# Patient Record
Sex: MALE | Race: OTHER | NOT HISPANIC OR LATINO
[De-identification: names, ages, dates, MRNs, and addresses within clinical notes are randomized per-mention and may not be internally consistent; named-entity substitution may affect disease eponyms.]

---

## 2017-02-08 ENCOUNTER — APPOINTMENT (OUTPATIENT)
Dept: OPHTHALMOLOGY | Facility: CLINIC | Age: 72
End: 2017-02-08

## 2017-02-08 RX ORDER — BRINZOLAMIDE 10 MG/ML
1 SUSPENSION/ DROPS OPHTHALMIC
Qty: 1 | Refills: 5 | Status: ACTIVE | COMMUNITY
Start: 2017-02-08 | End: 1900-01-01

## 2017-05-17 ENCOUNTER — APPOINTMENT (OUTPATIENT)
Dept: OPHTHALMOLOGY | Facility: CLINIC | Age: 72
End: 2017-05-17

## 2017-05-31 ENCOUNTER — APPOINTMENT (OUTPATIENT)
Dept: OPHTHALMOLOGY | Facility: CLINIC | Age: 72
End: 2017-05-31

## 2017-07-12 ENCOUNTER — APPOINTMENT (OUTPATIENT)
Dept: OPHTHALMOLOGY | Facility: CLINIC | Age: 72
End: 2017-07-12

## 2017-07-13 RX ORDER — DORZOLAMIDE HYDROCHLORIDE 20 MG/ML
2 SOLUTION OPHTHALMIC
Qty: 1 | Refills: 6 | Status: ACTIVE | COMMUNITY
Start: 2017-02-08 | End: 1900-01-01

## 2017-08-01 ENCOUNTER — APPOINTMENT (OUTPATIENT)
Dept: OPHTHALMOLOGY | Facility: AMBULATORY SURGERY CENTER | Age: 72
End: 2017-08-01
Payer: MEDICARE

## 2017-08-01 ENCOUNTER — OUTPATIENT (OUTPATIENT)
Dept: OUTPATIENT SERVICES | Facility: HOSPITAL | Age: 72
LOS: 1 days | Discharge: HOME | End: 2017-08-01

## 2017-08-01 PROCEDURE — 0449T INSJ AQUEOUS DRAIN DEV 1ST: CPT

## 2017-08-01 PROCEDURE — 66984 XCAPSL CTRC RMVL W/O ECP: CPT | Mod: RT

## 2017-08-02 ENCOUNTER — APPOINTMENT (OUTPATIENT)
Dept: OPHTHALMOLOGY | Facility: CLINIC | Age: 72
End: 2017-08-02
Payer: MEDICARE

## 2017-08-02 PROCEDURE — 99024 POSTOP FOLLOW-UP VISIT: CPT

## 2017-08-03 DIAGNOSIS — H26.9 UNSPECIFIED CATARACT: ICD-10-CM

## 2017-08-03 DIAGNOSIS — K44.9 DIAPHRAGMATIC HERNIA WITHOUT OBSTRUCTION OR GANGRENE: ICD-10-CM

## 2017-08-03 DIAGNOSIS — N40.0 BENIGN PROSTATIC HYPERPLASIA WITHOUT LOWER URINARY TRACT SYMPTOMS: ICD-10-CM

## 2017-08-03 DIAGNOSIS — M54.5 LOW BACK PAIN: ICD-10-CM

## 2017-08-03 DIAGNOSIS — I10 ESSENTIAL (PRIMARY) HYPERTENSION: ICD-10-CM

## 2017-08-03 DIAGNOSIS — H40.9 UNSPECIFIED GLAUCOMA: ICD-10-CM

## 2017-08-03 DIAGNOSIS — J45.909 UNSPECIFIED ASTHMA, UNCOMPLICATED: ICD-10-CM

## 2017-08-03 DIAGNOSIS — Z79.82 LONG TERM (CURRENT) USE OF ASPIRIN: ICD-10-CM

## 2017-08-09 ENCOUNTER — APPOINTMENT (OUTPATIENT)
Dept: OPHTHALMOLOGY | Facility: CLINIC | Age: 72
End: 2017-08-09
Payer: MEDICARE

## 2017-08-09 PROCEDURE — 99024 POSTOP FOLLOW-UP VISIT: CPT

## 2017-08-25 RX ORDER — PREDNISOLONE ACETATE 10 MG/ML
1 SUSPENSION/ DROPS OPHTHALMIC
Qty: 1 | Refills: 3 | Status: ACTIVE | COMMUNITY
Start: 2017-07-13 | End: 1900-01-01

## 2017-08-25 RX ORDER — OFLOXACIN 3 MG/ML
0.3 SOLUTION/ DROPS OPHTHALMIC
Qty: 1 | Refills: 2 | Status: ACTIVE | COMMUNITY
Start: 2017-07-13 | End: 1900-01-01

## 2017-08-30 ENCOUNTER — APPOINTMENT (OUTPATIENT)
Dept: OPHTHALMOLOGY | Facility: CLINIC | Age: 72
End: 2017-08-30
Payer: MEDICARE

## 2017-08-30 DIAGNOSIS — H34.211 PARTIAL RETINAL ARTERY OCCLUSION, RIGHT EYE: ICD-10-CM

## 2017-08-30 DIAGNOSIS — H25.813 COMBINED FORMS OF AGE-RELATED CATARACT, BILATERAL: ICD-10-CM

## 2017-08-30 DIAGNOSIS — Z96.1 PRESENCE OF INTRAOCULAR LENS: ICD-10-CM

## 2017-08-30 PROCEDURE — 99024 POSTOP FOLLOW-UP VISIT: CPT

## 2017-08-30 PROCEDURE — 92134 CPTRZ OPH DX IMG PST SGM RTA: CPT

## 2017-08-30 PROCEDURE — 92012 INTRM OPH EXAM EST PATIENT: CPT

## 2017-08-30 PROCEDURE — 0474T INSJ AQUEOUS DRG DEV IO RSVR: CPT

## 2017-08-30 PROCEDURE — 92226: CPT | Mod: LT

## 2017-09-05 ENCOUNTER — APPOINTMENT (OUTPATIENT)
Dept: OPHTHALMOLOGY | Facility: AMBULATORY SURGERY CENTER | Age: 72
End: 2017-09-05
Payer: MEDICARE

## 2017-09-05 ENCOUNTER — OUTPATIENT (OUTPATIENT)
Dept: OUTPATIENT SERVICES | Facility: HOSPITAL | Age: 72
LOS: 1 days | Discharge: HOME | End: 2017-09-05

## 2017-09-05 PROCEDURE — 66984 XCAPSL CTRC RMVL W/O ECP: CPT | Mod: 79,LT

## 2017-09-06 ENCOUNTER — APPOINTMENT (OUTPATIENT)
Dept: OPHTHALMOLOGY | Facility: CLINIC | Age: 72
End: 2017-09-06
Payer: MEDICARE

## 2017-09-06 PROCEDURE — 99024 POSTOP FOLLOW-UP VISIT: CPT

## 2017-09-07 DIAGNOSIS — I10 ESSENTIAL (PRIMARY) HYPERTENSION: ICD-10-CM

## 2017-09-07 DIAGNOSIS — N40.0 BENIGN PROSTATIC HYPERPLASIA WITHOUT LOWER URINARY TRACT SYMPTOMS: ICD-10-CM

## 2017-09-07 DIAGNOSIS — K21.9 GASTRO-ESOPHAGEAL REFLUX DISEASE WITHOUT ESOPHAGITIS: ICD-10-CM

## 2017-09-07 DIAGNOSIS — Z79.82 LONG TERM (CURRENT) USE OF ASPIRIN: ICD-10-CM

## 2017-09-07 DIAGNOSIS — H40.9 UNSPECIFIED GLAUCOMA: ICD-10-CM

## 2017-09-07 DIAGNOSIS — H25.12 AGE-RELATED NUCLEAR CATARACT, LEFT EYE: ICD-10-CM

## 2017-09-07 DIAGNOSIS — J45.909 UNSPECIFIED ASTHMA, UNCOMPLICATED: ICD-10-CM

## 2017-09-13 ENCOUNTER — APPOINTMENT (OUTPATIENT)
Dept: OPHTHALMOLOGY | Facility: CLINIC | Age: 72
End: 2017-09-13
Payer: MEDICARE

## 2017-09-13 PROCEDURE — 99024 POSTOP FOLLOW-UP VISIT: CPT

## 2017-12-07 ENCOUNTER — APPOINTMENT (OUTPATIENT)
Dept: OPHTHALMOLOGY | Facility: CLINIC | Age: 72
End: 2017-12-07
Payer: MEDICARE

## 2017-12-07 PROCEDURE — 65855 TRABECULOPLASTY LASER SURG: CPT | Mod: RT

## 2018-02-15 ENCOUNTER — APPOINTMENT (OUTPATIENT)
Dept: OPHTHALMOLOGY | Facility: CLINIC | Age: 73
End: 2018-02-15
Payer: MEDICARE

## 2018-02-15 PROCEDURE — 92012 INTRM OPH EXAM EST PATIENT: CPT

## 2018-02-28 ENCOUNTER — APPOINTMENT (OUTPATIENT)
Dept: OPHTHALMOLOGY | Facility: CLINIC | Age: 73
End: 2018-02-28
Payer: MEDICARE

## 2018-02-28 DIAGNOSIS — Z96.1 PRESENCE OF INTRAOCULAR LENS: ICD-10-CM

## 2018-02-28 PROCEDURE — 92226: CPT | Mod: LT

## 2018-02-28 PROCEDURE — 92014 COMPRE OPH EXAM EST PT 1/>: CPT

## 2018-02-28 PROCEDURE — 92134 CPTRZ OPH DX IMG PST SGM RTA: CPT

## 2018-05-17 ENCOUNTER — APPOINTMENT (OUTPATIENT)
Dept: OPHTHALMOLOGY | Facility: CLINIC | Age: 73
End: 2018-05-17
Payer: MEDICARE

## 2018-05-17 PROCEDURE — 92083 EXTENDED VISUAL FIELD XM: CPT

## 2018-05-17 PROCEDURE — 92014 COMPRE OPH EXAM EST PT 1/>: CPT

## 2018-05-17 PROCEDURE — 92020 GONIOSCOPY: CPT

## 2018-05-17 PROCEDURE — 92250 FUNDUS PHOTOGRAPHY W/I&R: CPT

## 2018-07-16 ENCOUNTER — APPOINTMENT (OUTPATIENT)
Dept: OPHTHALMOLOGY | Facility: CLINIC | Age: 73
End: 2018-07-16

## 2018-07-16 ENCOUNTER — OUTPATIENT (OUTPATIENT)
Dept: OUTPATIENT SERVICES | Facility: HOSPITAL | Age: 73
LOS: 1 days | End: 2018-07-16

## 2018-07-16 ENCOUNTER — APPOINTMENT (OUTPATIENT)
Dept: OPHTHALMOLOGY | Facility: CLINIC | Age: 73
End: 2018-07-16
Payer: MEDICARE

## 2018-07-16 PROCEDURE — 65855 TRABECULOPLASTY LASER SURG: CPT | Mod: LT

## 2018-07-17 DIAGNOSIS — H40.1420 CAPSULAR GLAUCOMA WITH PSEUDOEXFOLIATION OF LENS, LEFT EYE, STAGE UNSPECIFIED: ICD-10-CM

## 2018-07-30 ENCOUNTER — APPOINTMENT (OUTPATIENT)
Dept: OPHTHALMOLOGY | Facility: CLINIC | Age: 73
End: 2018-07-30
Payer: MEDICARE

## 2018-07-30 ENCOUNTER — APPOINTMENT (OUTPATIENT)
Dept: OPHTHALMOLOGY | Facility: CLINIC | Age: 73
End: 2018-07-30

## 2018-07-30 ENCOUNTER — OUTPATIENT (OUTPATIENT)
Dept: OUTPATIENT SERVICES | Facility: HOSPITAL | Age: 73
LOS: 1 days | End: 2018-07-30

## 2018-07-30 PROCEDURE — 65855 TRABECULOPLASTY LASER SURG: CPT | Mod: RT

## 2018-07-31 DIAGNOSIS — H40.1113 PRIMARY OPEN-ANGLE GLAUCOMA, RIGHT EYE, SEVERE STAGE: ICD-10-CM

## 2018-10-24 ENCOUNTER — APPOINTMENT (OUTPATIENT)
Dept: OPHTHALMOLOGY | Facility: CLINIC | Age: 73
End: 2018-10-24
Payer: MEDICARE

## 2018-10-24 PROCEDURE — 92012 INTRM OPH EXAM EST PATIENT: CPT

## 2018-10-24 RX ORDER — NETARSUDIL 0.2 MG/ML
0.02 SOLUTION/ DROPS OPHTHALMIC; TOPICAL
Qty: 1 | Refills: 6 | Status: ACTIVE | COMMUNITY
Start: 2018-10-24 | End: 1900-01-01

## 2018-12-12 ENCOUNTER — APPOINTMENT (OUTPATIENT)
Dept: OPHTHALMOLOGY | Facility: CLINIC | Age: 73
End: 2018-12-12
Payer: MEDICARE

## 2018-12-12 PROCEDURE — 92012 INTRM OPH EXAM EST PATIENT: CPT

## 2018-12-12 PROCEDURE — 92083 EXTENDED VISUAL FIELD XM: CPT

## 2018-12-12 PROCEDURE — 92134 CPTRZ OPH DX IMG PST SGM RTA: CPT

## 2018-12-12 RX ORDER — KETOROLAC TROMETHAMINE 5 MG/ML
0.5 SOLUTION OPHTHALMIC
Qty: 1 | Refills: 3 | Status: ACTIVE | COMMUNITY
Start: 2017-07-13 | End: 1900-01-01

## 2018-12-12 RX ORDER — BETAXOLOL HYDROCHLORIDE 5.6 MG/ML
0.5 SOLUTION/ DROPS OPHTHALMIC
Qty: 1 | Refills: 3 | Status: ACTIVE | COMMUNITY
Start: 2018-12-12 | End: 1900-01-01

## 2019-01-17 ENCOUNTER — APPOINTMENT (OUTPATIENT)
Dept: OPHTHALMOLOGY | Facility: CLINIC | Age: 74
End: 2019-01-17
Payer: MEDICARE

## 2019-01-17 DIAGNOSIS — H43.811 VITREOUS DEGENERATION, RIGHT EYE: ICD-10-CM

## 2019-01-17 DIAGNOSIS — H25.12 AGE-RELATED NUCLEAR CATARACT, LEFT EYE: ICD-10-CM

## 2019-01-17 DIAGNOSIS — H35.353 CYSTOID MACULAR DEGENERATION, BILATERAL: ICD-10-CM

## 2019-01-17 DIAGNOSIS — D31.32 BENIGN NEOPLASM OF LEFT CHOROID: ICD-10-CM

## 2019-01-17 DIAGNOSIS — H35.373 PUCKERING OF MACULA, BILATERAL: ICD-10-CM

## 2019-01-17 DIAGNOSIS — Z96.1 PRESENCE OF INTRAOCULAR LENS: ICD-10-CM

## 2019-01-17 PROCEDURE — 92134 CPTRZ OPH DX IMG PST SGM RTA: CPT

## 2019-01-17 PROCEDURE — 92014 COMPRE OPH EXAM EST PT 1/>: CPT

## 2019-01-17 PROCEDURE — 92226: CPT | Mod: LT

## 2019-02-07 ENCOUNTER — APPOINTMENT (OUTPATIENT)
Dept: OPHTHALMOLOGY | Facility: CLINIC | Age: 74
End: 2019-02-07
Payer: MEDICARE

## 2019-02-07 DIAGNOSIS — H40.1113 PRIMARY OPEN-ANGLE GLAUCOMA, RIGHT EYE, SEVERE STAGE: ICD-10-CM

## 2019-02-07 PROCEDURE — 92083 EXTENDED VISUAL FIELD XM: CPT

## 2019-02-07 PROCEDURE — 92012 INTRM OPH EXAM EST PATIENT: CPT

## 2019-06-06 ENCOUNTER — NON-APPOINTMENT (OUTPATIENT)
Age: 74
End: 2019-06-06

## 2019-06-06 ENCOUNTER — APPOINTMENT (OUTPATIENT)
Dept: OPHTHALMOLOGY | Facility: CLINIC | Age: 74
End: 2019-06-06
Payer: MEDICARE

## 2019-06-06 PROCEDURE — 92012 INTRM OPH EXAM EST PATIENT: CPT

## 2019-11-14 ENCOUNTER — APPOINTMENT (OUTPATIENT)
Dept: OPHTHALMOLOGY | Facility: CLINIC | Age: 74
End: 2019-11-14
Payer: MEDICARE

## 2019-11-14 ENCOUNTER — NON-APPOINTMENT (OUTPATIENT)
Age: 74
End: 2019-11-14

## 2019-11-14 PROCEDURE — 92083 EXTENDED VISUAL FIELD XM: CPT

## 2019-11-14 PROCEDURE — 92012 INTRM OPH EXAM EST PATIENT: CPT

## 2020-01-22 ENCOUNTER — APPOINTMENT (OUTPATIENT)
Dept: OPHTHALMOLOGY | Facility: CLINIC | Age: 75
End: 2020-01-22
Payer: MEDICARE

## 2020-01-22 ENCOUNTER — NON-APPOINTMENT (OUTPATIENT)
Age: 75
End: 2020-01-22

## 2020-01-22 PROCEDURE — 92134 CPTRZ OPH DX IMG PST SGM RTA: CPT

## 2020-01-22 PROCEDURE — 92201 OPSCPY EXTND RTA DRAW UNI/BI: CPT

## 2020-01-22 PROCEDURE — 92014 COMPRE OPH EXAM EST PT 1/>: CPT

## 2020-05-14 ENCOUNTER — NON-APPOINTMENT (OUTPATIENT)
Age: 75
End: 2020-05-14

## 2020-06-15 ENCOUNTER — NON-APPOINTMENT (OUTPATIENT)
Age: 75
End: 2020-06-15

## 2020-06-15 ENCOUNTER — APPOINTMENT (OUTPATIENT)
Dept: OPHTHALMOLOGY | Facility: CLINIC | Age: 75
End: 2020-06-15
Payer: MEDICARE

## 2020-06-15 PROCEDURE — 92020 GONIOSCOPY: CPT

## 2020-06-15 PROCEDURE — 92014 COMPRE OPH EXAM EST PT 1/>: CPT

## 2020-06-15 PROCEDURE — 92250 FUNDUS PHOTOGRAPHY W/I&R: CPT

## 2020-09-16 ENCOUNTER — APPOINTMENT (OUTPATIENT)
Dept: OPHTHALMOLOGY | Facility: CLINIC | Age: 75
End: 2020-09-16
Payer: MEDICARE

## 2020-09-16 ENCOUNTER — NON-APPOINTMENT (OUTPATIENT)
Age: 75
End: 2020-09-16

## 2020-09-16 PROCEDURE — 92133 CPTRZD OPH DX IMG PST SGM ON: CPT

## 2020-09-16 PROCEDURE — 92083 EXTENDED VISUAL FIELD XM: CPT

## 2020-09-16 PROCEDURE — 99213 OFFICE O/P EST LOW 20 MIN: CPT

## 2020-11-16 ENCOUNTER — NON-APPOINTMENT (OUTPATIENT)
Age: 75
End: 2020-11-16

## 2020-11-16 ENCOUNTER — APPOINTMENT (OUTPATIENT)
Dept: OPHTHALMOLOGY | Facility: CLINIC | Age: 75
End: 2020-11-16

## 2020-11-16 ENCOUNTER — OUTPATIENT (OUTPATIENT)
Dept: OUTPATIENT SERVICES | Facility: HOSPITAL | Age: 75
LOS: 1 days | End: 2020-11-16
Payer: MEDICARE

## 2020-11-16 PROCEDURE — 65855 TRABECULOPLASTY LASER SURG: CPT | Mod: LT

## 2020-11-17 DIAGNOSIS — H40.1133 PRIMARY OPEN-ANGLE GLAUCOMA, BILATERAL, SEVERE STAGE: ICD-10-CM

## 2021-01-04 ENCOUNTER — APPOINTMENT (OUTPATIENT)
Dept: OPHTHALMOLOGY | Facility: CLINIC | Age: 76
End: 2021-01-04

## 2021-01-04 ENCOUNTER — NON-APPOINTMENT (OUTPATIENT)
Age: 76
End: 2021-01-04

## 2021-01-04 ENCOUNTER — OUTPATIENT (OUTPATIENT)
Dept: OUTPATIENT SERVICES | Facility: HOSPITAL | Age: 76
LOS: 1 days | End: 2021-01-04
Payer: MEDICARE

## 2021-01-04 PROCEDURE — 65855 TRABECULOPLASTY LASER SURG: CPT | Mod: RT

## 2021-01-05 DIAGNOSIS — H40.1133 PRIMARY OPEN-ANGLE GLAUCOMA, BILATERAL, SEVERE STAGE: ICD-10-CM

## 2021-01-27 ENCOUNTER — APPOINTMENT (OUTPATIENT)
Dept: OPHTHALMOLOGY | Facility: CLINIC | Age: 76
End: 2021-01-27

## 2021-02-10 ENCOUNTER — NON-APPOINTMENT (OUTPATIENT)
Age: 76
End: 2021-02-10

## 2021-02-10 ENCOUNTER — APPOINTMENT (OUTPATIENT)
Dept: OPHTHALMOLOGY | Facility: CLINIC | Age: 76
End: 2021-02-10
Payer: MEDICARE

## 2021-02-10 PROCEDURE — 92012 INTRM OPH EXAM EST PATIENT: CPT

## 2021-02-10 PROCEDURE — 92083 EXTENDED VISUAL FIELD XM: CPT

## 2021-06-07 ENCOUNTER — APPOINTMENT (OUTPATIENT)
Dept: OPHTHALMOLOGY | Facility: CLINIC | Age: 76
End: 2021-06-07
Payer: MEDICARE

## 2021-06-07 ENCOUNTER — NON-APPOINTMENT (OUTPATIENT)
Age: 76
End: 2021-06-07

## 2021-06-07 PROCEDURE — 92201 OPSCPY EXTND RTA DRAW UNI/BI: CPT

## 2021-06-07 PROCEDURE — 92134 CPTRZ OPH DX IMG PST SGM RTA: CPT

## 2021-06-07 PROCEDURE — 92014 COMPRE OPH EXAM EST PT 1/>: CPT

## 2021-07-14 ENCOUNTER — APPOINTMENT (OUTPATIENT)
Dept: OPHTHALMOLOGY | Facility: CLINIC | Age: 76
End: 2021-07-14
Payer: MEDICARE

## 2021-07-14 ENCOUNTER — NON-APPOINTMENT (OUTPATIENT)
Age: 76
End: 2021-07-14

## 2021-07-14 PROCEDURE — 92020 GONIOSCOPY: CPT

## 2021-07-14 PROCEDURE — 92083 EXTENDED VISUAL FIELD XM: CPT

## 2021-07-14 PROCEDURE — 92014 COMPRE OPH EXAM EST PT 1/>: CPT

## 2021-07-14 PROCEDURE — 92250 FUNDUS PHOTOGRAPHY W/I&R: CPT

## 2021-11-17 ENCOUNTER — NON-APPOINTMENT (OUTPATIENT)
Age: 76
End: 2021-11-17

## 2021-11-17 ENCOUNTER — APPOINTMENT (OUTPATIENT)
Dept: OPHTHALMOLOGY | Facility: CLINIC | Age: 76
End: 2021-11-17
Payer: MEDICARE

## 2021-11-17 PROCEDURE — 92133 CPTRZD OPH DX IMG PST SGM ON: CPT

## 2021-11-17 PROCEDURE — 92014 COMPRE OPH EXAM EST PT 1/>: CPT

## 2021-11-17 PROCEDURE — 92083 EXTENDED VISUAL FIELD XM: CPT

## 2022-03-23 ENCOUNTER — APPOINTMENT (OUTPATIENT)
Dept: OPHTHALMOLOGY | Facility: CLINIC | Age: 77
End: 2022-03-23
Payer: MEDICARE

## 2022-03-23 ENCOUNTER — NON-APPOINTMENT (OUTPATIENT)
Age: 77
End: 2022-03-23

## 2022-03-23 PROCEDURE — 92134 CPTRZ OPH DX IMG PST SGM RTA: CPT

## 2022-03-23 PROCEDURE — 92083 EXTENDED VISUAL FIELD XM: CPT

## 2022-03-23 PROCEDURE — 99213 OFFICE O/P EST LOW 20 MIN: CPT

## 2022-06-13 ENCOUNTER — APPOINTMENT (OUTPATIENT)
Dept: OPHTHALMOLOGY | Facility: CLINIC | Age: 77
End: 2022-06-13
Payer: MEDICARE

## 2022-06-13 ENCOUNTER — NON-APPOINTMENT (OUTPATIENT)
Age: 77
End: 2022-06-13

## 2022-06-13 PROCEDURE — 92134 CPTRZ OPH DX IMG PST SGM RTA: CPT

## 2022-06-13 PROCEDURE — 92014 COMPRE OPH EXAM EST PT 1/>: CPT

## 2022-07-27 ENCOUNTER — NON-APPOINTMENT (OUTPATIENT)
Age: 77
End: 2022-07-27

## 2022-07-27 ENCOUNTER — APPOINTMENT (OUTPATIENT)
Dept: OPHTHALMOLOGY | Facility: CLINIC | Age: 77
End: 2022-07-27

## 2022-07-27 PROCEDURE — 92020 GONIOSCOPY: CPT

## 2022-07-27 PROCEDURE — 92250 FUNDUS PHOTOGRAPHY W/I&R: CPT

## 2022-07-27 PROCEDURE — 92083 EXTENDED VISUAL FIELD XM: CPT

## 2022-07-27 PROCEDURE — 92014 COMPRE OPH EXAM EST PT 1/>: CPT

## 2022-09-12 ENCOUNTER — NON-APPOINTMENT (OUTPATIENT)
Age: 77
End: 2022-09-12

## 2022-09-12 ENCOUNTER — APPOINTMENT (OUTPATIENT)
Dept: OPHTHALMOLOGY | Facility: CLINIC | Age: 77
End: 2022-09-12

## 2022-09-12 PROCEDURE — 66821 AFTER CATARACT LASER SURGERY: CPT | Mod: RT

## 2022-10-10 ENCOUNTER — APPOINTMENT (OUTPATIENT)
Dept: OPHTHALMOLOGY | Facility: CLINIC | Age: 77
End: 2022-10-10

## 2022-10-10 ENCOUNTER — NON-APPOINTMENT (OUTPATIENT)
Age: 77
End: 2022-10-10

## 2022-10-10 PROCEDURE — 66821 AFTER CATARACT LASER SURGERY: CPT | Mod: 78,LT

## 2022-12-08 ENCOUNTER — APPOINTMENT (OUTPATIENT)
Dept: OPHTHALMOLOGY | Facility: CLINIC | Age: 77
End: 2022-12-08

## 2022-12-08 ENCOUNTER — NON-APPOINTMENT (OUTPATIENT)
Age: 77
End: 2022-12-08

## 2022-12-08 PROCEDURE — 92012 INTRM OPH EXAM EST PATIENT: CPT | Mod: 24

## 2022-12-08 PROCEDURE — 92133 CPTRZD OPH DX IMG PST SGM ON: CPT

## 2022-12-08 PROCEDURE — 92083 EXTENDED VISUAL FIELD XM: CPT

## 2023-03-29 ENCOUNTER — APPOINTMENT (OUTPATIENT)
Dept: OPHTHALMOLOGY | Facility: CLINIC | Age: 78
End: 2023-03-29
Payer: MEDICARE

## 2023-03-29 ENCOUNTER — NON-APPOINTMENT (OUTPATIENT)
Age: 78
End: 2023-03-29

## 2023-03-29 PROCEDURE — 92012 INTRM OPH EXAM EST PATIENT: CPT

## 2023-03-29 PROCEDURE — 92083 EXTENDED VISUAL FIELD XM: CPT

## 2023-06-14 ENCOUNTER — APPOINTMENT (OUTPATIENT)
Dept: OPHTHALMOLOGY | Facility: CLINIC | Age: 78
End: 2023-06-14
Payer: MEDICARE

## 2023-06-14 ENCOUNTER — NON-APPOINTMENT (OUTPATIENT)
Age: 78
End: 2023-06-14

## 2023-06-14 PROCEDURE — 92134 CPTRZ OPH DX IMG PST SGM RTA: CPT

## 2023-06-14 PROCEDURE — 92014 COMPRE OPH EXAM EST PT 1/>: CPT

## 2023-06-14 PROCEDURE — 92201 OPSCPY EXTND RTA DRAW UNI/BI: CPT

## 2023-07-26 ENCOUNTER — NON-APPOINTMENT (OUTPATIENT)
Age: 78
End: 2023-07-26

## 2023-07-26 ENCOUNTER — APPOINTMENT (OUTPATIENT)
Dept: OPHTHALMOLOGY | Facility: CLINIC | Age: 78
End: 2023-07-26
Payer: MEDICARE

## 2023-07-26 PROCEDURE — 92083 EXTENDED VISUAL FIELD XM: CPT

## 2023-07-26 PROCEDURE — 92020 GONIOSCOPY: CPT

## 2023-07-26 PROCEDURE — 92250 FUNDUS PHOTOGRAPHY W/I&R: CPT

## 2023-07-26 PROCEDURE — 92014 COMPRE OPH EXAM EST PT 1/>: CPT

## 2023-12-13 ENCOUNTER — NON-APPOINTMENT (OUTPATIENT)
Age: 78
End: 2023-12-13

## 2023-12-13 ENCOUNTER — APPOINTMENT (OUTPATIENT)
Dept: OPHTHALMOLOGY | Facility: CLINIC | Age: 78
End: 2023-12-13
Payer: MEDICARE

## 2023-12-13 PROCEDURE — 92012 INTRM OPH EXAM EST PATIENT: CPT

## 2023-12-13 PROCEDURE — 92133 CPTRZD OPH DX IMG PST SGM ON: CPT

## 2024-04-17 ENCOUNTER — NON-APPOINTMENT (OUTPATIENT)
Age: 79
End: 2024-04-17

## 2024-04-17 ENCOUNTER — APPOINTMENT (OUTPATIENT)
Dept: OPHTHALMOLOGY | Facility: CLINIC | Age: 79
End: 2024-04-17
Payer: MEDICARE

## 2024-04-17 PROCEDURE — 92083 EXTENDED VISUAL FIELD XM: CPT

## 2024-04-17 PROCEDURE — 92012 INTRM OPH EXAM EST PATIENT: CPT

## 2024-06-20 ENCOUNTER — APPOINTMENT (OUTPATIENT)
Dept: OPHTHALMOLOGY | Facility: CLINIC | Age: 79
End: 2024-06-20
Payer: MEDICARE

## 2024-06-20 ENCOUNTER — NON-APPOINTMENT (OUTPATIENT)
Age: 79
End: 2024-06-20

## 2024-06-20 PROCEDURE — 92134 CPTRZ OPH DX IMG PST SGM RTA: CPT

## 2024-06-20 PROCEDURE — 92201 OPSCPY EXTND RTA DRAW UNI/BI: CPT

## 2024-06-20 PROCEDURE — 92014 COMPRE OPH EXAM EST PT 1/>: CPT

## 2024-09-04 ENCOUNTER — APPOINTMENT (OUTPATIENT)
Dept: OPHTHALMOLOGY | Facility: CLINIC | Age: 79
End: 2024-09-04
Payer: MEDICARE

## 2024-09-04 ENCOUNTER — NON-APPOINTMENT (OUTPATIENT)
Age: 79
End: 2024-09-04

## 2024-09-04 PROCEDURE — 92014 COMPRE OPH EXAM EST PT 1/>: CPT

## 2024-09-04 PROCEDURE — 92250 FUNDUS PHOTOGRAPHY W/I&R: CPT

## 2024-09-04 PROCEDURE — 92020 GONIOSCOPY: CPT

## 2024-09-04 PROCEDURE — 92083 EXTENDED VISUAL FIELD XM: CPT

## 2025-01-22 ENCOUNTER — EMERGENCY (EMERGENCY)
Age: 80
LOS: 1 days | Discharge: HOME | End: 2025-01-22
Attending: EMERGENCY MEDICINE | Admitting: EMERGENCY MEDICINE
Payer: MEDICARE

## 2025-01-22 VITALS
OXYGEN SATURATION: 98 % | SYSTOLIC BLOOD PRESSURE: 183 MMHG | HEART RATE: 94 BPM | RESPIRATION RATE: 18 BRPM | WEIGHT: 164.91 LBS | TEMPERATURE: 98 F | DIASTOLIC BLOOD PRESSURE: 93 MMHG
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
## 2025-01-22 DIAGNOSIS — S01.01XA LACERATION WITHOUT FOREIGN BODY OF SCALP, INITIAL ENCOUNTER: ICD-10-CM

## 2025-01-22 DIAGNOSIS — Z23 ENCOUNTER FOR IMMUNIZATION: ICD-10-CM
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
## 2025-01-22 DIAGNOSIS — Z79.01 LONG TERM (CURRENT) USE OF ANTICOAGULANTS: ICD-10-CM

## 2025-01-22 LAB
ALBUMIN SERPL ELPH-MCNC: 4.2 G/DL — SIGNIFICANT CHANGE UP (ref 3.4–5)
ALP SERPL-CCNC: 98 U/L — SIGNIFICANT CHANGE UP (ref 40–120)
ALT FLD-CCNC: 20 U/L — SIGNIFICANT CHANGE UP (ref 12–42)
ANION GAP SERPL CALC-SCNC: 11 MMOL/L — SIGNIFICANT CHANGE UP (ref 9–16)
APTT BLD: 36.6 SEC — HIGH (ref 24.5–35.6)
AST SERPL-CCNC: 17 U/L — SIGNIFICANT CHANGE UP (ref 15–37)
BASOPHILS # BLD AUTO: 0.04 K/UL — SIGNIFICANT CHANGE UP (ref 0–0.2)
BASOPHILS NFR BLD AUTO: 0.4 % — SIGNIFICANT CHANGE UP (ref 0–2)
BILIRUB SERPL-MCNC: 0.9 MG/DL — SIGNIFICANT CHANGE UP (ref 0.2–1.2)
BUN SERPL-MCNC: 25 MG/DL — HIGH (ref 7–23)
CALCIUM SERPL-MCNC: 9.2 MG/DL — SIGNIFICANT CHANGE UP (ref 8.5–10.5)
CHLORIDE SERPL-SCNC: 107 MMOL/L — SIGNIFICANT CHANGE UP (ref 96–108)
CO2 SERPL-SCNC: 25 MMOL/L — SIGNIFICANT CHANGE UP (ref 22–31)
CREAT SERPL-MCNC: 1.2 MG/DL — SIGNIFICANT CHANGE UP (ref 0.5–1.3)
EGFR: 62 ML/MIN/1.73M2 — SIGNIFICANT CHANGE UP
EGFR: 62 ML/MIN/1.73M2 — SIGNIFICANT CHANGE UP
EOSINOPHIL # BLD AUTO: 0.11 K/UL — SIGNIFICANT CHANGE UP (ref 0–0.5)
EOSINOPHIL NFR BLD AUTO: 1.1 % — SIGNIFICANT CHANGE UP (ref 0–6)
GLUCOSE SERPL-MCNC: 110 MG/DL — HIGH (ref 70–99)
HCT VFR BLD CALC: 43.9 % — SIGNIFICANT CHANGE UP (ref 39–50)
HGB BLD-MCNC: 13.9 G/DL — SIGNIFICANT CHANGE UP (ref 13–17)
IMM GRANULOCYTES NFR BLD AUTO: 0.5 % — SIGNIFICANT CHANGE UP (ref 0–0.9)
INR BLD: 1.18 — HIGH (ref 0.85–1.16)
LYMPHOCYTES # BLD AUTO: 2.1 K/UL — SIGNIFICANT CHANGE UP (ref 1–3.3)
LYMPHOCYTES # BLD AUTO: 20.6 % — SIGNIFICANT CHANGE UP (ref 13–44)
MCHC RBC-ENTMCNC: 27.6 PG — SIGNIFICANT CHANGE UP (ref 27–34)
MCHC RBC-ENTMCNC: 31.7 G/DL — LOW (ref 32–36)
MCV RBC AUTO: 87.3 FL — SIGNIFICANT CHANGE UP (ref 80–100)
MONOCYTES # BLD AUTO: 0.91 K/UL — HIGH (ref 0–0.9)
MONOCYTES NFR BLD AUTO: 8.9 % — SIGNIFICANT CHANGE UP (ref 2–14)
NEUTROPHILS # BLD AUTO: 6.99 K/UL — SIGNIFICANT CHANGE UP (ref 1.8–7.4)
NEUTROPHILS NFR BLD AUTO: 68.5 % — SIGNIFICANT CHANGE UP (ref 43–77)
NRBC # BLD: 0 /100 WBCS — SIGNIFICANT CHANGE UP (ref 0–0)
NRBC BLD-RTO: 0 /100 WBCS — SIGNIFICANT CHANGE UP (ref 0–0)
PLATELET # BLD AUTO: 266 K/UL — SIGNIFICANT CHANGE UP (ref 150–400)
POTASSIUM SERPL-MCNC: 4.2 MMOL/L — SIGNIFICANT CHANGE UP (ref 3.5–5.3)
POTASSIUM SERPL-SCNC: 4.2 MMOL/L — SIGNIFICANT CHANGE UP (ref 3.5–5.3)
PROT SERPL-MCNC: 7.8 G/DL — SIGNIFICANT CHANGE UP (ref 6.4–8.2)
PROTHROM AB SERPL-ACNC: 13.7 SEC — HIGH (ref 9.9–13.4)
RBC # BLD: 5.03 M/UL — SIGNIFICANT CHANGE UP (ref 4.2–5.8)
RBC # FLD: 13.1 % — SIGNIFICANT CHANGE UP (ref 10.3–14.5)
SODIUM SERPL-SCNC: 143 MMOL/L — SIGNIFICANT CHANGE UP (ref 132–145)
WBC # BLD: 10.2 K/UL — SIGNIFICANT CHANGE UP (ref 3.8–10.5)
WBC # FLD AUTO: 10.2 K/UL — SIGNIFICANT CHANGE UP (ref 3.8–10.5)

## 2025-01-22 PROCEDURE — 72125 CT NECK SPINE W/O DYE: CPT | Mod: 26

## 2025-01-22 PROCEDURE — 70450 CT HEAD/BRAIN W/O DYE: CPT | Mod: 26

## 2025-01-22 PROCEDURE — 99284 EMERGENCY DEPT VISIT MOD MDM: CPT | Mod: FS

## 2025-01-22 RX ORDER — AMOXICILLIN AND CLAVULANATE POTASSIUM 500; 125 MG/1; MG/1
875 TABLET, FILM COATED ORAL
Qty: 20 | Refills: 0
Start: 2025-01-22 | End: 2025-01-31

## 2025-01-22 RX ORDER — AMOXICILLIN AND CLAVULANATE POTASSIUM 500; 125 MG/1; MG/1
1 TABLET, FILM COATED ORAL ONCE
Refills: 0 | Status: COMPLETED | OUTPATIENT
Start: 2025-01-22 | End: 2025-01-22

## 2025-02-01 ENCOUNTER — EMERGENCY (EMERGENCY)
Facility: HOSPITAL | Age: 80
LOS: 1 days | Discharge: HOME | End: 2025-02-01
Attending: EMERGENCY MEDICINE | Admitting: EMERGENCY MEDICINE
Payer: MEDICARE

## 2025-02-01 VITALS
TEMPERATURE: 98 F | SYSTOLIC BLOOD PRESSURE: 129 MMHG | OXYGEN SATURATION: 100 % | HEART RATE: 90 BPM | DIASTOLIC BLOOD PRESSURE: 76 MMHG | RESPIRATION RATE: 16 BRPM

## 2025-02-01 DIAGNOSIS — S01.01XD LACERATION WITHOUT FOREIGN BODY OF SCALP, SUBSEQUENT ENCOUNTER: ICD-10-CM

## 2025-02-01 PROBLEM — Z79.01 LONG TERM (CURRENT) USE OF ANTICOAGULANTS: Chronic | Status: ACTIVE | Noted: 2025-01-23

## 2025-02-01 PROCEDURE — L9995: CPT

## 2025-02-01 NOTE — ED PROVIDER NOTE - OBJECTIVE STATEMENT
Patient seen here 10 days ago for laceration. Returns for suture removal. No headache, n/v, swelling, drainage.

## 2025-02-01 NOTE — ED PROVIDER NOTE - CLINICAL SUMMARY MEDICAL DECISION MAKING FREE TEXT BOX
Staples removed. Laceration was Y-shaped. All arms of laceration are fully closed. Central portion where arms meet is closing by secondary intention, 0.5x0.5cm area. Patient instructed on wound care. Return to the ED immediately if getting worse, not improving, or if having any new or troubling symptoms.

## 2025-02-01 NOTE — ED PROVIDER NOTE - PATIENT PORTAL LINK FT
You can access the FollowMyHealth Patient Portal offered by [facility] by registering at the following website: [email or web address]. By joining [employer]’s FollowMyHealth portal, you will also be able to view your health information using other applications (apps) compatible with our system.

## 2025-02-05 ENCOUNTER — NON-APPOINTMENT (OUTPATIENT)
Age: 80
End: 2025-02-05

## 2025-02-05 ENCOUNTER — APPOINTMENT (OUTPATIENT)
Dept: OPHTHALMOLOGY | Facility: CLINIC | Age: 80
End: 2025-02-05
Payer: MEDICARE

## 2025-02-05 PROCEDURE — 92133 CPTRZD OPH DX IMG PST SGM ON: CPT

## 2025-02-05 PROCEDURE — 92083 EXTENDED VISUAL FIELD XM: CPT

## 2025-02-05 PROCEDURE — 92012 INTRM OPH EXAM EST PATIENT: CPT

## 2025-06-21 ENCOUNTER — EMERGENCY (EMERGENCY)
Facility: HOSPITAL | Age: 80
LOS: 1 days | End: 2025-06-21
Attending: EMERGENCY MEDICINE | Admitting: EMERGENCY MEDICINE
Payer: MEDICARE

## 2025-06-21 VITALS
WEIGHT: 164.91 LBS | DIASTOLIC BLOOD PRESSURE: 90 MMHG | TEMPERATURE: 98 F | RESPIRATION RATE: 15 BRPM | OXYGEN SATURATION: 98 % | SYSTOLIC BLOOD PRESSURE: 161 MMHG | HEIGHT: 69 IN | HEART RATE: 93 BPM

## 2025-06-21 VITALS
HEART RATE: 85 BPM | OXYGEN SATURATION: 97 % | DIASTOLIC BLOOD PRESSURE: 91 MMHG | TEMPERATURE: 98 F | SYSTOLIC BLOOD PRESSURE: 158 MMHG | RESPIRATION RATE: 16 BRPM

## 2025-06-21 PROCEDURE — 99284 EMERGENCY DEPT VISIT MOD MDM: CPT

## 2025-06-21 RX ORDER — MIDAZOLAM IN 0.9 % SOD.CHLORID 1 MG/ML
1 PLASTIC BAG, INJECTION (ML) INTRAVENOUS ONCE
Refills: 0 | Status: COMPLETED | OUTPATIENT
Start: 2025-06-21 | End: 2025-06-21

## 2025-06-21 NOTE — ED PROVIDER NOTE - PHYSICAL EXAMINATION
Constitutional:  Spitting up saliva into emesis basin  HEENT: Airway patent, Nasal mucosa clear. Mouth with normal mucosa.   Eyes: Clear bilaterally, pupils equal, round and reactive to light.  Cardiac: Normal rate, regular rhythm.  Respiratory: Breath sounds clear and equal bilaterally.  GI: Abdomen soft, non-tender, no guarding.  MSK: Spine appears normal, range of motion is not limited, no muscle or joint tenderness  Neuro: Alert and oriented, no focal deficits, no motor or sensory deficits.  Skin: Skin normal color for race, warm, dry and intact. No evidence of rash.

## 2025-06-21 NOTE — ED PROVIDER NOTE - OBJECTIVE STATEMENT
79-year-old male with multiple medical problems including CAD, A-fib, prediabetes presented to the emergency room with food impaction in esophagus.  States that just prior to arrival, he was eating dry steak, states that a piece of it got stuck, is unable to tolerate secretions at this time, cannot drink water, denies any difficulty breathing or shortness of breath.  States that this has happened to him a long time ago.

## 2025-06-21 NOTE — ED ADULT NURSE NOTE - OBJECTIVE STATEMENT
Pt came in reports getting a piece of steak stuck in his throat. His airway is patent. He is able to speak clearly. He does report some discomfort and is gaging but not vomiting.

## 2025-06-21 NOTE — ED PROVIDER NOTE - CCCP TRG CHIEF CMPLNT
foreign body throat
GOAL: Pt will improved B UE/LE strength to for increased limb stability, to improve gait, and facilitate stair negotiation in 4 weeks

## 2025-06-21 NOTE — ED PROVIDER NOTE - PATIENT PORTAL LINK FT
You can access the FollowMyHealth Patient Portal offered by [facility] by registering at the following website: [email or web address]. By joining [employer]’s FollowMyHealth portal, you will also be able to view your health information using other applications (apps) compatible with our system.

## 2025-06-21 NOTE — ED PROVIDER NOTE - CLINICAL SUMMARY MEDICAL DECISION MAKING FREE TEXT BOX
A/P: 79-year-old male with multiple medical problems including CAD, A-fib, prediabetes presented to the emergency room with food impaction in esophagus.  States that just prior to arrival, he was eating dry steak, states that a piece of it got stuck, is unable to tolerate secretions at this time, cannot drink water, denies any difficulty breathing or shortness of breath.  States that this has happened to him a long time ago.  --Patient is breathing normally, not concerned about airway compromise  -- Attempted to dislodge food impaction by combination of jumping up and down with carbonated beverage, however after several attempts, patient was still unable to pass the food impaction, nor was able to throw it up  -- Patient is very concerned about taking glucagon due to his cardiac conditions, prefers to try Versed and lie on left lateral decub and attempt small sips of water

## 2025-06-21 NOTE — ED ADULT TRIAGE NOTE - CHIEF COMPLAINT QUOTE
Pt with complaint of feeling the steak he was eating is stuck in his throat. Pt reports difficulty swallowing his saliva. Denies any difficulty breathing. Able to speak in full sentences.

## 2025-06-21 NOTE — ED ADULT NURSE REASSESSMENT NOTE - NS ED NURSE REASSESS COMMENT FT1
pt went to the bathroom vomited a few times and said he saw the steak pass. he also verbalized that he fel relieved. All vitals assessed. All meds discontinued. Pt being discharged.

## 2025-06-21 NOTE — ED PROVIDER NOTE - PROGRESS NOTE DETAILS
Patient's food bolus passed completely, is able to tolerate liquids, no regurgitation, states he is feeling normal.  Patient is stable for discharge

## 2025-06-21 NOTE — ED PROVIDER NOTE - NSFOLLOWUPINSTRUCTIONS_ED_ALL_ED_FT
Swallowed Foreign Body, Adult  Body outline showing the digestive tract, including the stomach and esophagus.  A swallowed foreign body is an object that gets stuck in the digestive tract, either in the part of the body that moves food from the mouth to the stomach (esophagus) or in another part. When a person swallows an object, it passes into the esophagus. The narrowest place in the digestive system is where the esophagus meets the stomach. If the object can pass through that place, it will usually continue through the rest of the digestive system without causing problems. A foreign body that gets stuck may need to be removed.    Foreign bodies may be swallowed by accident or on purpose. It is very important to tell your health care provider what you have swallowed. Some swallowed objects can be life-threatening, and you may need emergency treatment. Dangerous swallowed foreign bodies include:  Objects that get stuck in your throat.  Objects that make you unable to swallow.  Objects that interfere with your breathing.  Sharp objects.  Harmful or poisonous (toxic) objects, such as batteries or illegal drugs.  What are the causes?  The most common swallowed foreign body is food that will not pass through your esophagus to your stomach (food impaction). Foods that commonly become impacted include meats and hard vegetables such as carrots and radishes.    Other common swallowed foreign bodies include:  Pieces of bone from meat or fish.  Toothpicks.  Dentures.  What increases the risk?  You are more likely to have a swallowed foreign body if you:  Wear dentures.  Have been drinking alcohol or taking drugs.  Have a mental health condition.  Have difficulties with thinking and learning (cognitive impairment).  Have a narrowed or scarred area in your digestive tract.  What are the signs or symptoms?  Symptoms of this condition include:  Pain or pressure in your throat or chest.  Not being able to swallow food, liquid, or your saliva.  Drooling.  Choking.  A hoarse voice.  Noisy breathing or trouble breathing.  Vomit that has blood in it.  How is this diagnosed?  This condition may be diagnosed based on your symptoms and medical history. Your health care provider will do a physical exam to confirm the diagnosis and to find the object. A metal detector may be used to find metal objects. Imaging studies may also be done, including:  X-rays.  A CT scan.  Some objects may not be seen on imaging studies. In those cases, an exam or procedure may be done using a scope to look into your esophagus (endoscopy).    How is this treated?  Usually, an object that has passed into your stomach but is not dangerous will pass through your digestive system without treatment. If the swallowed object is not dangerous but is stuck in your esophagus:  Your health care provider may gently suction out the object through your mouth.  You may be given medicine to relax the muscles of your esophagus to allow the object to pass through.  An endoscopy may be done to find and remove the object if it does not pass through with medicine. Your health care provider will put medical instruments through the endoscope to remove the object. You may need emergency treatment if:  The object is in your esophagus and is causing you to inhale saliva into your lungs (aspirate).  The object is in your esophagus and is pressing on your airway. This makes it hard for you to breathe.  The object can damage your digestive tract. Some objects that can cause damage include batteries, magnets, sharp objects, and drugs.  Follow these instructions at home:  Caring for yourself    If the object in your digestive system is expected to pass:  Continue eating what you usually eat unless your health care provider gives you different instructions.  Check your stool after every bowel movement to see if you have passed the object.  If you had an endoscopic procedure to remove the foreign body, follow instructions from your health care provider about caring for yourself after the procedure.  General instructions    Take over-the-counter and prescription medicines only as told by your health care provider.  Keep all follow-up visits, including any for repeat imaging tests. This is important.  How is this prevented?  Cut your food into small pieces.  Always sit upright while eating.  Remove bones from meat and fish.  Chew your food well before swallowing.  Do not talk, laugh, or walk around while eating or swallowing.  Contact a health care provider if:  You continue to have symptoms of a swallowed foreign body.  The object has not passed out of your body after 3 days.  Get help right away if:  You have a fever.  You have pain in your chest or your abdomen.  You cough up blood.  You have blood in your stool (feces).  You have blood in your vomit after treatment.  These symptoms may be an emergency. Get help right away. Call 911.  Do not wait to see if the symptoms will go away.  Do not drive yourself to the hospital.  Summary  A swallowed foreign body is an object that gets stuck in the digestive tract, either in the esophagus or in another part.  Usually, an object that has passed into your stomach but is not dangerous will pass through your digestive system without treatment.  Endoscopy may be done to find and remove the object.  If the object in your digestive system is expected to pass, contact your health care provider if the object has not passed after 3 days.  Get help right away if you have blood in your stool or there is blood when you cough or vomit.  This information is not intended to replace advice given to you by your health care provider. Make sure you discuss any questions you have with your health care provider.    Document Revised: 01/19/2023 Document Reviewed: 01/19/2023 Esther Ward MD

## 2025-06-22 ENCOUNTER — INPATIENT (INPATIENT)
Facility: HOSPITAL | Age: 80
LOS: 0 days | Discharge: HOME | End: 2025-06-23
Attending: STUDENT IN AN ORGANIZED HEALTH CARE EDUCATION/TRAINING PROGRAM | Admitting: STUDENT IN AN ORGANIZED HEALTH CARE EDUCATION/TRAINING PROGRAM
Payer: MEDICARE

## 2025-06-22 VITALS
HEART RATE: 86 BPM | RESPIRATION RATE: 18 BRPM | OXYGEN SATURATION: 98 % | SYSTOLIC BLOOD PRESSURE: 141 MMHG | WEIGHT: 164.91 LBS | TEMPERATURE: 98 F | DIASTOLIC BLOOD PRESSURE: 90 MMHG | HEIGHT: 69 IN

## 2025-06-22 DIAGNOSIS — H40.9 UNSPECIFIED GLAUCOMA: ICD-10-CM

## 2025-06-22 DIAGNOSIS — Z29.9 ENCOUNTER FOR PROPHYLACTIC MEASURES, UNSPECIFIED: ICD-10-CM

## 2025-06-22 DIAGNOSIS — G20.A1 PARKINSON'S DISEASE WITHOUT DYSKINESIA, WITHOUT MENTION OF FLUCTUATIONS: ICD-10-CM

## 2025-06-22 DIAGNOSIS — J45.990 EXERCISE INDUCED BRONCHOSPASM: ICD-10-CM

## 2025-06-22 DIAGNOSIS — I65.29 OCCLUSION AND STENOSIS OF UNSPECIFIED CAROTID ARTERY: ICD-10-CM

## 2025-06-22 DIAGNOSIS — T18.128A FOOD IN ESOPHAGUS CAUSING OTHER INJURY, INITIAL ENCOUNTER: ICD-10-CM

## 2025-06-22 DIAGNOSIS — I48.91 UNSPECIFIED ATRIAL FIBRILLATION: ICD-10-CM

## 2025-06-22 DIAGNOSIS — R73.03 PREDIABETES: ICD-10-CM

## 2025-06-22 LAB
ALBUMIN SERPL ELPH-MCNC: 2.8 G/DL — LOW (ref 3.4–5)
ALBUMIN SERPL ELPH-MCNC: 3.4 G/DL — SIGNIFICANT CHANGE UP (ref 3.4–5)
ALP SERPL-CCNC: 72 U/L — SIGNIFICANT CHANGE UP (ref 40–120)
ALP SERPL-CCNC: 89 U/L — SIGNIFICANT CHANGE UP (ref 40–120)
ALT FLD-CCNC: 15 U/L — SIGNIFICANT CHANGE UP (ref 12–42)
ALT FLD-CCNC: 19 U/L — SIGNIFICANT CHANGE UP (ref 12–42)
ANION GAP SERPL CALC-SCNC: 11 MMOL/L — SIGNIFICANT CHANGE UP (ref 9–16)
ANION GAP SERPL CALC-SCNC: 14 MMOL/L — SIGNIFICANT CHANGE UP (ref 9–16)
AST SERPL-CCNC: 17 U/L — SIGNIFICANT CHANGE UP (ref 15–37)
AST SERPL-CCNC: 25 U/L — SIGNIFICANT CHANGE UP (ref 15–37)
BILIRUB SERPL-MCNC: 0.9 MG/DL — SIGNIFICANT CHANGE UP (ref 0.2–1.2)
BILIRUB SERPL-MCNC: 1.6 MG/DL — HIGH (ref 0.2–1.2)
BUN SERPL-MCNC: 17 MG/DL — SIGNIFICANT CHANGE UP (ref 7–23)
BUN SERPL-MCNC: 19 MG/DL — SIGNIFICANT CHANGE UP (ref 7–23)
CALCIUM SERPL-MCNC: 6.8 MG/DL — LOW (ref 8.5–10.5)
CALCIUM SERPL-MCNC: 8.1 MG/DL — LOW (ref 8.5–10.5)
CHLORIDE SERPL-SCNC: 109 MMOL/L — HIGH (ref 96–108)
CHLORIDE SERPL-SCNC: 114 MMOL/L — HIGH (ref 96–108)
CO2 SERPL-SCNC: 20 MMOL/L — LOW (ref 22–31)
CO2 SERPL-SCNC: 23 MMOL/L — SIGNIFICANT CHANGE UP (ref 22–31)
CREAT SERPL-MCNC: 0.75 MG/DL — SIGNIFICANT CHANGE UP (ref 0.5–1.3)
CREAT SERPL-MCNC: 0.89 MG/DL — SIGNIFICANT CHANGE UP (ref 0.5–1.3)
EGFR: 88 ML/MIN/1.73M2 — SIGNIFICANT CHANGE UP
EGFR: 88 ML/MIN/1.73M2 — SIGNIFICANT CHANGE UP
EGFR: 93 ML/MIN/1.73M2 — SIGNIFICANT CHANGE UP
EGFR: 93 ML/MIN/1.73M2 — SIGNIFICANT CHANGE UP
GLUCOSE SERPL-MCNC: 106 MG/DL — HIGH (ref 70–99)
GLUCOSE SERPL-MCNC: 98 MG/DL — SIGNIFICANT CHANGE UP (ref 70–99)
HCT VFR BLD CALC: 38.6 % — LOW (ref 39–50)
HGB BLD-MCNC: 12.6 G/DL — LOW (ref 13–17)
MAGNESIUM SERPL-MCNC: 1.4 MG/DL — LOW (ref 1.6–2.6)
MAGNESIUM SERPL-MCNC: 2.1 MG/DL — SIGNIFICANT CHANGE UP (ref 1.6–2.6)
MCHC RBC-ENTMCNC: 28.1 PG — SIGNIFICANT CHANGE UP (ref 27–34)
MCHC RBC-ENTMCNC: 32.6 G/DL — SIGNIFICANT CHANGE UP (ref 32–36)
MCV RBC AUTO: 86 FL — SIGNIFICANT CHANGE UP (ref 80–100)
NRBC # BLD AUTO: 0 K/UL — SIGNIFICANT CHANGE UP (ref 0–0)
NRBC # FLD: 0 K/UL — SIGNIFICANT CHANGE UP (ref 0–0)
NRBC BLD AUTO-RTO: 0 /100 WBCS — SIGNIFICANT CHANGE UP (ref 0–0)
PLATELET # BLD AUTO: 278 K/UL — SIGNIFICANT CHANGE UP (ref 150–400)
PMV BLD: 10.1 FL — SIGNIFICANT CHANGE UP (ref 7–13)
POTASSIUM SERPL-MCNC: 3 MMOL/L — LOW (ref 3.5–5.3)
POTASSIUM SERPL-MCNC: 4.3 MMOL/L — SIGNIFICANT CHANGE UP (ref 3.5–5.3)
POTASSIUM SERPL-SCNC: 3 MMOL/L — LOW (ref 3.5–5.3)
POTASSIUM SERPL-SCNC: 4.3 MMOL/L — SIGNIFICANT CHANGE UP (ref 3.5–5.3)
PROT SERPL-MCNC: 5.3 G/DL — LOW (ref 6.4–8.2)
PROT SERPL-MCNC: 6.4 G/DL — SIGNIFICANT CHANGE UP (ref 6.4–8.2)
RBC # BLD: 4.49 M/UL — SIGNIFICANT CHANGE UP (ref 4.2–5.8)
RBC # FLD: 13.1 % — SIGNIFICANT CHANGE UP (ref 10.3–14.5)
SODIUM SERPL-SCNC: 143 MMOL/L — SIGNIFICANT CHANGE UP (ref 132–145)
SODIUM SERPL-SCNC: 148 MMOL/L — HIGH (ref 132–145)
TROPONIN I, HIGH SENSITIVITY RESULT: 10.1 NG/L — SIGNIFICANT CHANGE UP
WBC # BLD: 11.41 K/UL — HIGH (ref 3.8–10.5)
WBC # FLD AUTO: 11.41 K/UL — HIGH (ref 3.8–10.5)

## 2025-06-22 PROCEDURE — 71260 CT THORAX DX C+: CPT | Mod: 26

## 2025-06-22 PROCEDURE — 99223 1ST HOSP IP/OBS HIGH 75: CPT

## 2025-06-22 PROCEDURE — 43235 EGD DIAGNOSTIC BRUSH WASH: CPT | Mod: GC

## 2025-06-22 PROCEDURE — 99223 1ST HOSP IP/OBS HIGH 75: CPT | Mod: GC,25

## 2025-06-22 PROCEDURE — 99223 1ST HOSP IP/OBS HIGH 75: CPT | Mod: GC

## 2025-06-22 RX ORDER — ONDANSETRON HCL/PF 4 MG/2 ML
4 VIAL (ML) INJECTION ONCE
Refills: 0 | Status: COMPLETED | OUTPATIENT
Start: 2025-06-22 | End: 2025-06-22

## 2025-06-22 RX ORDER — LISINOPRIL 5 MG/1
1 TABLET ORAL
Refills: 0 | DISCHARGE

## 2025-06-22 RX ORDER — GLUCAGON 3 MG/1
1 POWDER NASAL ONCE
Refills: 0 | Status: COMPLETED | OUTPATIENT
Start: 2025-06-22 | End: 2025-06-22

## 2025-06-22 RX ORDER — DILTIAZEM HYDROCHLORIDE 240 MG/1
120 TABLET, EXTENDED RELEASE ORAL DAILY
Refills: 0 | Status: DISCONTINUED | OUTPATIENT
Start: 2025-06-23 | End: 2025-06-23

## 2025-06-22 RX ORDER — ATORVASTATIN CALCIUM 80 MG/1
1 TABLET, FILM COATED ORAL
Refills: 0 | DISCHARGE

## 2025-06-22 RX ORDER — ATORVASTATIN CALCIUM 80 MG/1
20 TABLET, FILM COATED ORAL AT BEDTIME
Refills: 0 | Status: DISCONTINUED | OUTPATIENT
Start: 2025-06-22 | End: 2025-06-23

## 2025-06-22 RX ORDER — NETARSUDIL AND LATANOPROST OPHTHALMIC SOLUTION, 0.02%/0.005% .2; .05 MG/ML; MG/ML
1 SOLUTION/ DROPS OPHTHALMIC; TOPICAL
Refills: 0 | DISCHARGE

## 2025-06-22 RX ORDER — LISINOPRIL 5 MG/1
40 TABLET ORAL DAILY
Refills: 0 | Status: DISCONTINUED | OUTPATIENT
Start: 2025-06-22 | End: 2025-06-23

## 2025-06-22 RX ORDER — MAGNESIUM SULFATE 500 MG/ML
1 SYRINGE (ML) INJECTION ONCE
Refills: 0 | Status: COMPLETED | OUTPATIENT
Start: 2025-06-22 | End: 2025-06-22

## 2025-06-22 RX ORDER — APIXABAN 2.5 MG/1
5 TABLET, FILM COATED ORAL EVERY 12 HOURS
Refills: 0 | Status: DISCONTINUED | OUTPATIENT
Start: 2025-06-22 | End: 2025-06-23

## 2025-06-22 RX ORDER — APIXABAN 2.5 MG/1
1 TABLET, FILM COATED ORAL
Refills: 0 | DISCHARGE

## 2025-06-22 RX ORDER — DILTIAZEM HYDROCHLORIDE 240 MG/1
1 TABLET, EXTENDED RELEASE ORAL
Refills: 0 | DISCHARGE

## 2025-06-22 RX ADMIN — Medication 40 MILLIGRAM(S): at 07:20

## 2025-06-22 RX ADMIN — APIXABAN 5 MILLIGRAM(S): 2.5 TABLET, FILM COATED ORAL at 19:54

## 2025-06-22 RX ADMIN — Medication 4 MILLIGRAM(S): at 15:02

## 2025-06-22 RX ADMIN — Medication 4 MILLIGRAM(S): at 13:55

## 2025-06-22 RX ADMIN — Medication 20 MILLIGRAM(S): at 07:21

## 2025-06-22 RX ADMIN — Medication 250 MILLILITER(S): at 03:56

## 2025-06-22 RX ADMIN — Medication 4 MILLIGRAM(S): at 09:51

## 2025-06-22 RX ADMIN — Medication 100 GRAM(S): at 07:41

## 2025-06-22 RX ADMIN — Medication 100 MILLIEQUIVALENT(S): at 11:40

## 2025-06-22 RX ADMIN — LISINOPRIL 40 MILLIGRAM(S): 5 TABLET ORAL at 19:54

## 2025-06-22 RX ADMIN — Medication 100 MILLIEQUIVALENT(S): at 08:40

## 2025-06-22 RX ADMIN — ATORVASTATIN CALCIUM 20 MILLIGRAM(S): 80 TABLET, FILM COATED ORAL at 21:17

## 2025-06-22 RX ADMIN — Medication 100 MILLIEQUIVALENT(S): at 07:22

## 2025-06-22 NOTE — H&P ADULT - PROBLEM SELECTOR PLAN 6
Patient takes Atrovent as needed for asthma.     Plan:   -Continue medication vs therapeutic interchange.

## 2025-06-22 NOTE — H&P ADULT - PROBLEM SELECTOR PLAN 3
Patient with hx of carotid stenosis which is currently being monitored outpatient.     Plan:   -Continue on Lipitor 20 mg

## 2025-06-22 NOTE — H&P ADULT - PROBLEM SELECTOR PLAN 7
Plan:  F: s/p 2L1L NS in ED  E: replete K<4, Mg<2  N: NPO (NO MEDS for now)  VTE Prophylaxis: Lovenox after endoscopy   GI: nothing at this  but likely will need PPI   C: Full Code  D: LASHAUN ECG with atrial fibrillation  -Resume Eliquis once able to tolerate PO

## 2025-06-22 NOTE — ED PROVIDER NOTE - OBJECTIVE STATEMENT
79-year-old male with past medical history that includes atrial fibrillation, CAD, prediabetes presenting to the emergency room for sensation of foreign body in the esophagus.  Patient was in the ED earlier for similar symptoms, while in the ED the first time, he was able to dislodge the impaction by jumping up and down and drinking carbonated beverages.  Denies any difficulty breathing.  States that currently he is not sure if he is able to tolerate his secretions or not.

## 2025-06-22 NOTE — H&P ADULT - PROBLEM SELECTOR PLAN 5
Stage I Parkinson's per patient. Follows outpatient.   Plan:   -follow up with outpatient neurologist/PCP.

## 2025-06-22 NOTE — ED ADULT TRIAGE NOTE - CHIEF COMPLAINT QUOTE
Pt walked into ER stating he was here earlier and is having the same issue swallowing or feeling like something is stuck in his throat. No respiratory distress observed or reported. Pt denies further at triage.

## 2025-06-22 NOTE — CONSULT NOTE ADULT - SUBJECTIVE AND OBJECTIVE BOX
HPI:  This is a 79 year old male with atrial fibrillation (anticoagulated with apixaban) and coronary artery disease who gastroenterology has been consulted for dysphagia. Patient describes eating dry steak at home and felt as if food became stuck. Developed discomfort as well as nausea and vomiting. Attempted to dislodge impaction at home by jumping up and down as well as consuming carbonated drinks. Was unsuccessful and presented to [facility] for further evaluation. CT chest with IV contrast identified distal esophageal hernia, possibly paraesophageal. Just proximal to the hernia the distal esophagus is distended where there may be impacted ingested material. Patient then transferred to [facility] for further evaluation and emergent EGD as patient no longer able to tolerate secretions. At time of my initial interview, patient with emesis bag in hand filled with secretions and emesis. Denies previous EGD and states that he can typically self dislodge food when he develops impacted symptoms.     PAST MEDICAL & SURGICAL HISTORY:  On anticoagulant therapy        Home Medications:    Allergies    No Known Allergies    Intolerances      SOCIAL HISTORY:  Denies tobacco use  Denies alcohol use  Denies drug use    FAMILY HISTORY:    Mother: Healthy  Father: Healthy  MEDICATIONS  (STANDING):    MEDICATIONS  (PRN):      REVIEW OF SYSTEMS:  All 14 review of systems are negative except as noted in HPI.    Vital Signs Last 24 Hrs  T(C): 36.3 (22 Jun 2025 16:20), Max: 36.8 (21 Jun 2025 21:34)  T(F): 97.4 (22 Jun 2025 16:20), Max: 98.3 (21 Jun 2025 21:34)  HR: 99 (22 Jun 2025 16:20) (85 - 99)  BP: 163/93 (22 Jun 2025 16:20) (141/90 - 163/93)  BP(mean): --  RR: 18 (22 Jun 2025 16:20) (15 - 18)  SpO2: 97% (22 Jun 2025 16:20) (97% - 100%)    Parameters below as of 22 Jun 2025 16:20  Patient On (Oxygen Delivery Method): room air          PHYSICAL EXAM:    General: Elderly male, standing at bedside, awake, in mild acute distress secondary to vomiting   Eyes: Anicteric sclerae, moist conjunctivae, extraocular motions intact, pupils equal round and reactive to light  HENT: Pink and moist mucous membranes, good dentition, tongue normal in appearance without lesions and has symmetrical movement, no obvious oral lesions noted, pharynx normal without tonsillar swelling or exudates  Neck: Trachea midline, supple, no obvious lymphadenopathy  Chest: Symmetric appearing, non tender to palpation  Cardiovascular: Regular rate and rhythm, no obvious murmur rub or gallop  Respiratory: Normal respiratory effort, clear lung sounds in anterior and posterior posts bilaterally, no obvious rales ronchi or wheeze  Abdomen: Symmetric appearing, no obvious lesions, non-distended, normal bowel sounds, soft, non-tender to palpation, no rebound or guarding  Extremities: Normal range of motion, no clubbing cyanosis or edema  Neurological: Awake alert and oriented to person place time and situation  Skin: Warm and dry, no obvious rash, no jaundice    LABS:                        12.6   11.41 )-----------( 278      ( 22 Jun 2025 03:26 )             38.6     06-22    143  |  109[H]  |  17  ----------------------------<  106[H]  4.3   |  23  |  0.89    Ca    8.1[L]      22 Jun 2025 08:39  Mg     2.1     06-22    TPro  6.4  /  Alb  3.4  /  TBili  1.6[H]  /  DBili  x   /  AST  25  /  ALT  19  /  AlkPhos  89  06-22            RADIOLOGY & ADDITIONAL STUDIES:

## 2025-06-22 NOTE — PATIENT PROFILE ADULT - FALL HARM RISK - HARM RISK INTERVENTIONS

## 2025-06-22 NOTE — ED PROVIDER NOTE - CARE PLAN
Principal Discharge DX:	Impacted esophageal foreign body   1 Principal Discharge DX:	Esophagitis  Secondary Diagnosis:	Hiatal hernia

## 2025-06-22 NOTE — H&P ADULT - PROBLEM SELECTOR PLAN 4
Patient with hx of prediabetes managed with diet and exercise.     Plan:   -CTM   -finger sticks q 6 hrs while NPO   -consider mISS if needed pending finger sticks

## 2025-06-22 NOTE — H&P ADULT - PROBLEM SELECTOR PLAN 8
Plan:  F: s/p 2L1L NS in ED  E: replete K<4, Mg<2  N: NPO (NO MEDS for now)  VTE Prophylaxis: Lovenox after endoscopy   GI: nothing at this  but likely will need PPI   C: Full Code  D: LASHAUN

## 2025-06-22 NOTE — H&P ADULT - CONVERSATION DETAILS
Patient states that he is full code and wants everything done within reason to save his life. Patient would like CPR and intubation. Also states that he is an organ donor.

## 2025-06-22 NOTE — ED ADULT NURSE REASSESSMENT NOTE - NS ED NURSE REASSESS COMMENT FT1
Pt declining Glucagon, states he does not want it due to his cardiac conditions. Pt aware of benefits of glucagon and continues to decline. Pt verbalized understanding, aware if he has specific questions on interactions to ask this RN or MD.

## 2025-06-22 NOTE — CHART NOTE - NSCHARTNOTEFT_GEN_A_CORE
6/22/25 EGD    Impressions:  	Normal stomach.  	Normal duodenum.  	Grade D esophagitis in the GE junction and 3-4 cm above the GEJ compatible with non-erosive esophagitis.  	Esophageal hiatal hernia.      Plan:	  Food bolus advanced into stomach. Patient no longer obstructed  Advance diet as tolerated  Protonix 40 mg po bid x2 weeks, followed by once daily x6 weeks  Carafate Suspension 1g po qid x2 weeks  Outpatient follow up      Henry Sutton DO  Gastroenterology Fellow  GI Consult Pager Weekdays 7am-5pm: [phone]  Weeknights/Weekend/Holiday Coverage: Please Call the  for Contact Information  Follow Up Questions Welcome via [place] [employer] Teams Messenger

## 2025-06-22 NOTE — H&P ADULT - HISTORY OF PRESENT ILLNESS
78 yo M with pmhx of carotid stenosis diagnosed 2 yrs a go; 70% on one side and 50% on another side but does not remember which side), Prediabetes (controlled with diet/exercise), Afib (on Eliquis 5 mg BID), Glaucoma (on Betaxolol and Rocklatan), BPH, and Stage I Parkinson's, presenting for food impaction. Patient states that he ate steak yesterday for lunch and then went to [facility] after because he felt the steak had become stuck in his throat. Thereafter he did jumping jacks in the ED to help dislodge the food which he felt had properly dislodged and he went home. At 1:30 AM, he felt his saliva build up in his throat and he was concerned about handling his secretions so he went to the ED. He states that this has happened before and it occurred almost 2-3 yrs a go with similar symptoms but he resolved it by drinking lots of water. Vicki sates that he has never had an endoscopy but does get colonoscopies on time. His last one was 1-2 yrs a go and it was normal (GI DOCTOR : Dr. Davis [phone]). Patient states that he has never had radiation or chemotherapy, has never been diagnosed with cancer, has not had unintentional weight loss, night sweats, or bone pain or fevers. Patient states that he smoked 1 pack a day for 10 yrs (10 py hx) and smokes a cigar once a week, he drinks 1 glass of hard liquor daily. Patient otherwise denies any drug use.  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 Able to do all ADLs on his own, exercises.    In the ED:   Vitals: 97.5 F, HR 86, /90, RR 18, SPO2 98%  Labs: WBC 11.41, Hgb 12.6, Na 143, Potassium 4.3, Cr 0.89, Bilirubin 1.6   Imaging>> CT AP >> Distal esophageal hernia, possibly paraesophageal. Just proximal to the hernia the distal esophagus is distended where there may be impacted   ingested material. No radio opaque esophageal foreign body visualized.  Interventions: Pantoprazole 40 mg x1, Potassium Cl 10 meQx3, Famotidine 20 mg x1, Zofran 4 mg x2, Mag 1g x1 IV  Consults: GI (planned for scope today)

## 2025-06-22 NOTE — ED PROVIDER NOTE - CLINICAL SUMMARY MEDICAL DECISION MAKING FREE TEXT BOX
A/P: 79-year-old male with past medical history that includes atrial fibrillation, CAD, prediabetes presenting to the emergency room for sensation of foreign body in the esophagus.  Patient was in the ED earlier for similar symptoms, while in the ED the first time, he was able to dislodge the impaction by jumping up and down and drinking carbonated beverages.  Denies any difficulty breathing.  States that currently he is not sure if he is able to tolerate his secretions or not.  --Fiberoptic scope was placed in the patient's nasal passage, was able to visualize the esophagus without finding any foreign body  -- Plan to obtain a CT for confirmation of possible swallowed foreign body

## 2025-06-22 NOTE — CONSULT NOTE ADULT - ATTENDING COMMENTS
Patient seen, examined, and discussed with Dr. Sutton. Agree with above. 79M with a h/o  atrial fibrillation (anticoagulated with apixaban) and coronary artery disease who gastroenterology has been consulted for dysphagia. Patient describes eating dry steak at home and felt as if food became stuck. Plan for emergent EGD in OR as patient unable to tolerate secretions.     Edgard Noonan MD  Gastroenterology

## 2025-06-22 NOTE — H&P ADULT - NSHPLABSRESULTS_GEN_ALL_CORE
.  LABS:                         12.6   11.41 )-----------( 278      ( 22 Jun 2025 03:26 )             38.6     06-22    143  |  109[H]  |  17  ----------------------------<  106[H]  4.3   |  23  |  0.89    Ca    8.1[L]      22 Jun 2025 08:39  Mg     2.1     06-22    TPro  6.4  /  Alb  3.4  /  TBili  1.6[H]  /  DBili  x   /  AST  25  /  ALT  19  /  AlkPhos  89  06-22      Urinalysis Basic - ( 22 Jun 2025 08:39 )    Color: x / Appearance: x / SG: x / pH: x  Gluc: 106 mg/dL / Ketone: x  / Bili: x / Urobili: x   Blood: x / Protein: x / Nitrite: x   Leuk Esterase: x / RBC: x / WBC x   Sq Epi: x / Non Sq Epi: x / Bacteria: x                RADIOLOGY, EKG & ADDITIONAL TESTS: Reviewed.

## 2025-06-22 NOTE — H&P ADULT - PROBLEM SELECTOR PLAN 2
Patient states that he takes Betaxolol outpatient for eye drops one drop in each eye and Rocklatan one drop in each eye qd.     Plan:   -Continue with meds when patient can get someone to bring them in for him.

## 2025-06-22 NOTE — ED PROVIDER NOTE - PROGRESS NOTE DETAILS
Patient still unable to swallow.  CT shows evidence of esophagitis and hiatal hernia, patient is already aware that he had a hiatal hernia.  Patient also has hypokalemia and hyper natremia on labs, likely due to not being able to eat and drink properly.  I strongly encouraged that the patient be admitted in the hospital for further management, however patient does not want to be admitted at this time, wishes to try further medications.  Will order Protonix and Pepcid and reevaluate later. Patient follows with Dr. Uriostegui at [facility], requesting to be transferred to [facility].  Spoke to transfer center and physician at [facility], currently no beds available.  Spoke to Dr. Uriostegui's partner who is attempting get hold of Dr. Uriostegui for possible transfer to [facility] if beds open up.  Patient standing in plan and in agreement that if transfer to [facility] is not possible he will be admitted to [facility].

## 2025-06-22 NOTE — CONSULT NOTE ADULT - ASSESSMENT
This is a 79 year old male with atrial fibrillation (anticoagulated with apixaban) and coronary artery disease who gastroenterology has been consulted for dysphagia. Patient describes eating dry steak at home and felt as if food became stuck.    6/22/25 CT chest with IV contrast identified distal esophageal hernia, possibly paraesophageal. Just proximal to the hernia the distal esophagus is distended where there may be impacted ingested material.     #Dysphagia secondary to possible food impaction  #Hiatal hernia  - Given inability to tolerate secretions, patient requires emergent EGD  - Maintain NPO  - Further recommendations pending EGD      Henry Sutton DO  Gastroenterology Fellow  GI Consult Pager Weekdays 7am-5pm: [phone]  Weeknights/Weekend/Holiday Coverage: Please Call the  for Contact Information  Follow Up Questions Welcome via [place] [employer] Teams Messenger

## 2025-06-22 NOTE — ED ADULT NURSE NOTE - NSFALLUNIVINTERV_ED_ALL_ED
Bed/Stretcher in lowest position, wheels locked, appropriate side rails in place/Call bell, personal items and telephone in reach/Instruct patient to call for assistance before getting out of bed/chair/stretcher/Non-slip footwear applied when patient is off stretcher/[place] to call system/Physically safe environment - no spills, clutter or unnecessary equipment/Purposeful proactive rounding/Room/bathroom lighting operational, light cord in reach

## 2025-06-22 NOTE — H&P ADULT - TIME BILLING
The necessity of the time spent during the encounter on this date of service was due to:     coordination of care; preparing to see Pt.; interviewing Pt.; examining Pt.; reviewing labs and images; documentation in [place]; d/w Medicine resident on rounds.

## 2025-06-22 NOTE — ED CDU PROVIDER DISPOSITION NOTE - CLINICAL COURSE
d/w Dr Culp attending medicine hospitalist and Dr Dailey GI, admit, K+ and Mg+ and Ca+ improved, pt refuses glucagon

## 2025-06-22 NOTE — ED CDU PROVIDER INITIAL DAY NOTE - PROGRESS NOTE DETAILS
Patient will be transferred to [facility] for GI evaluation and possible endoscopy for impacted ingested material with a large hiatal hernia.  Patient stable, could not administer, patient still not tolerating p.o..  Patient accepted by Dr. Leigh ENT.

## 2025-06-22 NOTE — ED ADULT NURSE NOTE - OBJECTIVE STATEMENT
pt reports having difficulty swallowing, no swelling seen in throat, pt reports no difficulty breathing

## 2025-06-22 NOTE — ED ADULT NURSE NOTE - IN ACCORDANCE WITH [place] LAW, WE OFFER EVERY PATIENT A HEPATITIS C TEST. WOULD YOU LIKE TO BE TESTED TODAY?
Please know that our office will communicate with you as soon as we receive completed results.  Some of our specialty labs take longer than 2 weeks to receive, thus you may receive a survey prior to your results being available.  If you should receive a survey while awaiting your results or feel that it has been longer than anticipated, please feel free to call our office to discuss.      Opt out

## 2025-06-22 NOTE — H&P ADULT - PROBLEM SELECTOR PLAN 1
Presenting with food impaction that has been occurring over the last few years. Patient does not have a history of smoking and daily alcohol use which both can increase risk for esophageal cancer, therefore this diagnosis should not be missed. Patient is able to protect his airway but currently having a hard time tolerating his secretions. He is planned for a scope with GI today to disimpact him as the CT AP with IV contrast does show some esophageal impaction.  Differential includes: malignancy vs Schatzki Ring (Age group), dysmotility/achalasia, Zenker's (less likely, not seen on CT imaging),     Plan:   -NPO (not even medications at this time until after scope)   -esophageal manometry if motility disorder is suspected  -follow up GI recs for plan and diet s/p scope   -

## 2025-06-22 NOTE — H&P ADULT - ASSESSMENT
78 yo M with pmhx of carotid stenosis diagnosed 2 yrs a go; 70% on one side and 50% on another side but does not remember which side), Prediabetes (controlled with diet/exercise), Afib (on Eliquis 5 mg BID), Glaucoma (on Betaxolol and Rocklatan), BPH, and Stage I Parkinson's, presenting for food impaction, pending scope with GI and further workup.

## 2025-06-22 NOTE — H&P ADULT - ATTENDING COMMENTS
80 yo M with pmhx of carotid stenosis diagnosed 2 yrs a go; 70% on one side and 50% on another side but does not remember which side), Prediabetes (controlled with diet/exercise), Afib (on Eliquis 5 mg BID), Glaucoma (on Betaxolol and Rocklatan), BPH, and Stage I Parkinson's, presenting for food impaction, pending scope with GI and further workup.     Plan  -GI following, patient to undergo emergent endoscopy today  -keep NPO  -resume meds once cleared for PO intake by GI  -ECG with rate controlled atrial fibrillation, CHADVASC (at least 3, (age >75, vascular disease)), resume NOAC once cleared by GI; if unable to restart AC for a prolonged period, can initiate lovenox (afib dosing)

## 2025-06-23 ENCOUNTER — TRANSCRIPTION ENCOUNTER (OUTPATIENT)
Age: 80
End: 2025-06-23

## 2025-06-23 ENCOUNTER — APPOINTMENT (OUTPATIENT)
Dept: OPHTHALMOLOGY | Facility: CLINIC | Age: 80
End: 2025-06-23

## 2025-06-23 VITALS
SYSTOLIC BLOOD PRESSURE: 128 MMHG | RESPIRATION RATE: 18 BRPM | HEART RATE: 95 BPM | DIASTOLIC BLOOD PRESSURE: 85 MMHG | OXYGEN SATURATION: 97 % | TEMPERATURE: 98 F

## 2025-06-23 DIAGNOSIS — T18.128A FOOD IN ESOPHAGUS CAUSING OTHER INJURY, INITIAL ENCOUNTER: ICD-10-CM
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
## 2025-06-23 LAB
ALBUMIN SERPL ELPH-MCNC: 3.8 G/DL — SIGNIFICANT CHANGE UP (ref 3.3–5)
ALP SERPL-CCNC: 84 U/L — SIGNIFICANT CHANGE UP (ref 40–120)
ALT FLD-CCNC: 12 U/L — SIGNIFICANT CHANGE UP (ref 10–45)
ANION GAP SERPL CALC-SCNC: 11 MMOL/L — SIGNIFICANT CHANGE UP (ref 5–17)
AST SERPL-CCNC: 18 U/L — SIGNIFICANT CHANGE UP (ref 10–40)
BASOPHILS # BLD AUTO: 0.04 K/UL — SIGNIFICANT CHANGE UP (ref 0–0.2)
BASOPHILS NFR BLD AUTO: 0.5 % — SIGNIFICANT CHANGE UP (ref 0–2)
BILIRUB SERPL-MCNC: 1.8 MG/DL — HIGH (ref 0.2–1.2)
BLD GP AB SCN SERPL QL: NEGATIVE — SIGNIFICANT CHANGE UP
BUN SERPL-MCNC: 13 MG/DL — SIGNIFICANT CHANGE UP (ref 7–23)
CALCIUM SERPL-MCNC: 8.8 MG/DL — SIGNIFICANT CHANGE UP (ref 8.4–10.5)
CHLORIDE SERPL-SCNC: 109 MMOL/L — HIGH (ref 96–108)
CO2 SERPL-SCNC: 21 MMOL/L — LOW (ref 22–31)
CREAT SERPL-MCNC: 0.93 MG/DL — SIGNIFICANT CHANGE UP (ref 0.5–1.3)
EGFR: 85 ML/MIN/1.73M2 — SIGNIFICANT CHANGE UP
EGFR: 85 ML/MIN/1.73M2 — SIGNIFICANT CHANGE UP
EOSINOPHIL # BLD AUTO: 0.11 K/UL — SIGNIFICANT CHANGE UP (ref 0–0.5)
EOSINOPHIL NFR BLD AUTO: 1.5 % — SIGNIFICANT CHANGE UP (ref 0–6)
GLUCOSE SERPL-MCNC: 104 MG/DL — HIGH (ref 70–99)
HCT VFR BLD CALC: 39.9 % — SIGNIFICANT CHANGE UP (ref 39–50)
HGB BLD-MCNC: 12.5 G/DL — LOW (ref 13–17)
IMM GRANULOCYTES # BLD AUTO: 0.02 K/UL — SIGNIFICANT CHANGE UP (ref 0–0.07)
IMM GRANULOCYTES NFR BLD AUTO: 0.3 % — SIGNIFICANT CHANGE UP (ref 0–0.9)
LYMPHOCYTES # BLD AUTO: 1.38 K/UL — SIGNIFICANT CHANGE UP (ref 1–3.3)
LYMPHOCYTES NFR BLD AUTO: 18.9 % — SIGNIFICANT CHANGE UP (ref 13–44)
MAGNESIUM SERPL-MCNC: 2.1 MG/DL — SIGNIFICANT CHANGE UP (ref 1.6–2.6)
MCHC RBC-ENTMCNC: 27.7 PG — SIGNIFICANT CHANGE UP (ref 27–34)
MCHC RBC-ENTMCNC: 31.3 G/DL — LOW (ref 32–36)
MCV RBC AUTO: 88.5 FL — SIGNIFICANT CHANGE UP (ref 80–100)
MONOCYTES # BLD AUTO: 0.79 K/UL — SIGNIFICANT CHANGE UP (ref 0–0.9)
MONOCYTES NFR BLD AUTO: 10.8 % — SIGNIFICANT CHANGE UP (ref 2–14)
NEUTROPHILS # BLD AUTO: 4.97 K/UL — SIGNIFICANT CHANGE UP (ref 1.8–7.4)
NEUTROPHILS NFR BLD AUTO: 68 % — SIGNIFICANT CHANGE UP (ref 43–77)
NRBC # BLD AUTO: 0 K/UL — SIGNIFICANT CHANGE UP (ref 0–0)
NRBC # FLD: 0 K/UL — SIGNIFICANT CHANGE UP (ref 0–0)
NRBC BLD AUTO-RTO: 0 /100 WBCS — SIGNIFICANT CHANGE UP (ref 0–0)
PHOSPHATE SERPL-MCNC: 4 MG/DL — SIGNIFICANT CHANGE UP (ref 2.5–4.5)
PLATELET # BLD AUTO: 243 K/UL — SIGNIFICANT CHANGE UP (ref 150–400)
PMV BLD: 10.3 FL — SIGNIFICANT CHANGE UP (ref 7–13)
POTASSIUM SERPL-MCNC: 4.1 MMOL/L — SIGNIFICANT CHANGE UP (ref 3.5–5.3)
POTASSIUM SERPL-SCNC: 4.1 MMOL/L — SIGNIFICANT CHANGE UP (ref 3.5–5.3)
PROT SERPL-MCNC: 6.1 G/DL — SIGNIFICANT CHANGE UP (ref 6–8.3)
RBC # BLD: 4.51 M/UL — SIGNIFICANT CHANGE UP (ref 4.2–5.8)
RBC # FLD: 13.2 % — SIGNIFICANT CHANGE UP (ref 10.3–14.5)
RH IG SCN BLD-IMP: POSITIVE — SIGNIFICANT CHANGE UP
SODIUM SERPL-SCNC: 141 MMOL/L — SIGNIFICANT CHANGE UP (ref 135–145)
WBC # BLD: 7.31 K/UL — SIGNIFICANT CHANGE UP (ref 3.8–10.5)
WBC # FLD AUTO: 7.31 K/UL — SIGNIFICANT CHANGE UP (ref 3.8–10.5)

## 2025-06-23 PROCEDURE — 85025 COMPLETE CBC W/AUTO DIFF WBC: CPT

## 2025-06-23 PROCEDURE — 96376 TX/PRO/DX INJ SAME DRUG ADON: CPT

## 2025-06-23 PROCEDURE — 99239 HOSP IP/OBS DSCHRG MGMT >30: CPT

## 2025-06-23 PROCEDURE — 99285 EMERGENCY DEPT VISIT HI MDM: CPT | Mod: 25

## 2025-06-23 PROCEDURE — 71260 CT THORAX DX C+: CPT

## 2025-06-23 PROCEDURE — 96374 THER/PROPH/DIAG INJ IV PUSH: CPT

## 2025-06-23 PROCEDURE — 96375 TX/PRO/DX INJ NEW DRUG ADDON: CPT

## 2025-06-23 PROCEDURE — 84100 ASSAY OF PHOSPHORUS: CPT

## 2025-06-23 PROCEDURE — G0378: CPT

## 2025-06-23 PROCEDURE — 85027 COMPLETE CBC AUTOMATED: CPT

## 2025-06-23 PROCEDURE — 84484 ASSAY OF TROPONIN QUANT: CPT

## 2025-06-23 PROCEDURE — 86850 RBC ANTIBODY SCREEN: CPT

## 2025-06-23 PROCEDURE — 36415 COLL VENOUS BLD VENIPUNCTURE: CPT

## 2025-06-23 PROCEDURE — 86901 BLOOD TYPING SEROLOGIC RH(D): CPT

## 2025-06-23 PROCEDURE — 99282 EMERGENCY DEPT VISIT SF MDM: CPT

## 2025-06-23 PROCEDURE — 83735 ASSAY OF MAGNESIUM: CPT

## 2025-06-23 PROCEDURE — 80053 COMPREHEN METABOLIC PANEL: CPT

## 2025-06-23 PROCEDURE — 0652T EGD FLX TRANSNASAL DX BR/WA: CPT

## 2025-06-23 PROCEDURE — 86900 BLOOD TYPING SEROLOGIC ABO: CPT

## 2025-06-23 RX ORDER — SUCRALFATE 1 G
1 TABLET ORAL
Qty: 56 | Refills: 0
Start: 2025-06-23 | End: 2025-07-06

## 2025-06-23 RX ORDER — SUCRALFATE 1 G
1 TABLET ORAL EVERY 6 HOURS
Refills: 0 | Status: DISCONTINUED | OUTPATIENT
Start: 2025-06-23 | End: 2025-06-23

## 2025-06-23 RX ADMIN — LISINOPRIL 40 MILLIGRAM(S): 5 TABLET ORAL at 05:30

## 2025-06-23 RX ADMIN — Medication 40 MILLIGRAM(S): at 09:38

## 2025-06-23 RX ADMIN — Medication 1 GRAM(S): at 09:38

## 2025-06-23 RX ADMIN — APIXABAN 5 MILLIGRAM(S): 2.5 TABLET, FILM COATED ORAL at 05:29

## 2025-06-23 NOTE — DISCHARGE NOTE PROVIDER - NSDCFUSCHEDAPPT_GEN_ALL_CORE_FT
Kristopher Diza  [facility]  OPHTHALM [address]  Scheduled Appointment: 06/25/2025

## 2025-06-23 NOTE — DISCHARGE NOTE NURSING/CASE MANAGEMENT/SOCIAL WORK - NSDCPEFALRISK_GEN_ALL_CORE
For information on Fall & Injury Prevention, visit: [email or web address] OR  [email or web address] OR  https://www.cdc.gov/steadi/patient.html

## 2025-06-23 NOTE — DISCHARGE NOTE PROVIDER - HOSPITAL COURSE
#Discharge: do not delete  78 yo M with pmhx of carotid stenosis diagnosed 2 yrs a go; 70% on one side and 50% on another side but does not remember which side), Prediabetes (controlled with diet/exercise), Afib (on Eliquis 5 mg BID), Glaucoma (on Betaxolol and Rocklatan), BPH, and Stage I Parkinson's, presented for food impaction. Patient now s/p EGD with food bolus advanced into stomach, patient no longer obstructed. Advanced diet from full liquid to soft and bite sized and tolerated it well. Started on Pantoprazole 40 mg po bid x2 a day for two weeks and then one daily for 6 weeks. Patient will get Carafate suspension 1 g PO four times a day for two weeks. He will follow up with his outpatient Gastro doctor within two weeks.     Hospital course (by problem):  #Food Impaction of Esophagus  #Grade D Esophagitis   #Hiatal Hernia   Patient presented with food impaction that has been occurring over the last few years. Patient does not have a history of smoking and daily alcohol use which both can increase risk for esophageal cancer, however, EGD was not remarkable for concerns for cancer. Appears to have Grade D Esophagitis and Hiatal Hernia that may be contributing to his recurrent food impaction. Last episode was 2-3 yrs a go. Now s/p EGD. HDS, tolerating oral diet (soft and bite sized).   Plan:   Follow up with outpatient GI doc (Dr. Davis [phone]) within 2 days   Can continue on soft and bite sized diet outpatient   Pantoprazole 40 mg po bid x2 weeks, followed by once daily x6 weeks  Carafate Suspension 1g po qid x2 weeks    Patient was discharged to: (home/[facility]/acute rehab/hospice, etc, and with what services – home health PT/RN? Home O2?)    New medications: Pantoprazole 40 mg po bid x2 weeks, followed by once daily x6 weeks                                 Carafate Suspension 1g po qid x2 weeks  Changes to old medications: None  Medications that were stopped: None    Items to follow up as outpatient: GI doctor (Dr. Davis)     Physical exam at the time of discharge: AAOx3; NAD; RRR, no murmurs; lungs CLAB; abd NT/ND; extremities wwp, no peripheral edema.     Patient was medically optimized, stable and ready for discharge. Plan of care and return precautions were discussed with the patient who verbally stated understanding. On the day of discharge, the patient was seen and examined. Symptoms improved. Vital signs are stable. Labs and imaging reviewed. Patient is medically optimized and hemodynamically stable. Return precautions discussed, medication teach back done, and importance of physician follow up emphasized. The patient verbalized understanding. #Discharge: do not delete  80 yo M with pmhx of carotid stenosis diagnosed 2 yrs a go; 70% on one side and 50% on another side but does not remember which side), Prediabetes (controlled with diet/exercise), Afib (on Eliquis 5 mg BID), Glaucoma (on Betaxolol and Rocklatan), BPH, and Stage I Parkinson's, presented for food impaction. Patient now s/p EGD with food bolus advanced into stomach, patient no longer obstructed. Advanced diet from full liquid to soft and bite sized and tolerated it well. Started on Pantoprazole 40 mg po bid x2 a day for two weeks and then one daily for 6 weeks. Patient will get Carafate suspension 1 g PO four times a day for two weeks. He will follow up with his outpatient Gastro doctor within two weeks.     Hospital course (by problem):  #Food Impaction of Esophagus  #Grade D Esophagitis   #Hiatal Hernia   Patient presented with food impaction that has been occurring over the last few years. Patient does not have a history of smoking and daily alcohol use which both can increase risk for esophageal cancer, however, EGD was not remarkable for concerns for cancer. Appears to have Grade D Esophagitis and Hiatal Hernia that may be contributing to his recurrent food impaction. Last episode was 2-3 yrs a go. Now s/p EGD. HDS, tolerating oral diet (soft and bite sized).   Plan:   Follow up with outpatient GI doc (Dr. Davis [phone]) within 2 days   Can continue on soft and bite sized diet outpatient   Pantoprazole 40 mg po bid x2 weeks, followed by once daily x6 weeks  Carafate Suspension 1g po qid x2 weeks    Patient was discharged to: home     New medications: Pantoprazole 40 mg po bid x2 weeks, followed by once daily x6 weeks                                 Carafate Suspension 1g po qid x2 weeks  Changes to old medications: None  Medications that were stopped: None    Items to follow up as outpatient: GI doctor (Dr. Davis)     Physical exam at the time of discharge: AAOx3; NAD; RRR, no murmurs; lungs CLAB; abd NT/ND; extremities wwp, no peripheral edema.     Patient was medically optimized, stable and ready for discharge. Plan of care and return precautions were discussed with the patient who verbally stated understanding. On the day of discharge, the patient was seen and examined. Symptoms improved. Vital signs are stable. Labs and imaging reviewed. Patient is medically optimized and hemodynamically stable. Return precautions discussed, medication teach back done, and importance of physician follow up emphasized. The patient verbalized understanding.

## 2025-06-23 NOTE — PROGRESS NOTE ADULT - SUBJECTIVE AND OBJECTIVE BOX
GASTROENTEROLOGY PROGRESS NOTE    INTERVAL/SUBJECTIVE:  Patient feels well today. Denies abdominal pain, nausea, vomiting, dysphagia, and odynophagia.     Allergies    No Known Allergies    Intolerances      MEDICATIONS:  MEDICATIONS  (STANDING):  apixaban 5 milliGRAM(s) Oral every 12 hours  atorvastatin 20 milliGRAM(s) Oral at bedtime  diltiazem    milliGRAM(s) Oral daily  lisinopril 40 milliGRAM(s) Oral daily  pantoprazole    Tablet 40 milliGRAM(s) Oral every 12 hours    MEDICATIONS  (PRN):  ipratropium 17 MICROgram(s) HFA Inhaler 1 Puff(s) Inhalation every 6 hours PRN for bronchospasm    Vital Signs Last 24 Hrs  T(C): 36.4 (23 Jun 2025 05:25), Max: 36.7 (22 Jun 2025 18:58)  T(F): 97.6 (23 Jun 2025 05:25), Max: 98 (22 Jun 2025 18:58)  HR: 95 (23 Jun 2025 05:25) (89 - 105)  BP: 128/85 (23 Jun 2025 05:25) (128/85 - 168/82)  BP(mean): 113 (22 Jun 2025 19:28) (113 - 120)  RR: 18 (23 Jun 2025 05:25) (10 - 18)  SpO2: 97% (23 Jun 2025 05:25) (97% - 100%)    Parameters below as of 23 Jun 2025 05:25  Patient On (Oxygen Delivery Method): room air        06-22 @ 07:01  -  06-23 @ 07:00  --------------------------------------------------------  IN: 150 mL / OUT: 200 mL / NET: -50 mL        General: Elderly male, lying in bed, awake, well developed, well nourished, in no acute distress  Eyes: Anicteric sclerae, moist conjunctivae, extraocular motions intact, pupils equal round and reactive to light  HENT: Pink and moist mucous membranes, good dentition, tongue normal in appearance without lesions and has symmetrical movement, no obvious oral lesions noted, pharynx normal without tonsillar swelling or exudates  Abdomen: Symmetric appearing, no obvious lesions, non-distended, normal bowel sounds, soft, non-tender to palpation, no rebound or guarding  Skin: Warm and dry, no obvious rash, no jaundice      LABS:                        12.5   7.31  )-----------( 243      ( 23 Jun 2025 06:32 )             39.9     06-23    141  |  109[H]  |  13  ----------------------------<  104[H]  4.1   |  21[L]  |  0.93    Ca    8.8      23 Jun 2025 06:32  Phos  4.0     06-23  Mg     2.1     06-23    TPro  6.1  /  Alb  3.8  /  TBili  1.8[H]  /  DBili  x   /  AST  18  /  ALT  12  /  AlkPhos  84  06-23        RADIOLOGY & ADDITIONAL STUDIES: Reviewed

## 2025-06-23 NOTE — PROGRESS NOTE ADULT - ASSESSMENT
This is a 79 year old male with atrial fibrillation (anticoagulated with apixaban) and coronary artery disease who gastroenterology has been consulted for dysphagia. Patient describes eating dry steak at home and felt as if food became stuck.    6/22/25 CT chest with IV contrast identified distal esophageal hernia, possibly paraesophageal. Just proximal to the hernia the distal esophagus is distended where there may be impacted ingested material.     6/22/25 EGD  Impressions:  	Normal stomach.  	Normal duodenum.  	Grade D esophagitis in the GE junction and 3-4 cm above the GEJ compatible with non-erosive esophagitis.  	Esophageal hiatal hernia.    #Dysphagia secondary to food impaction, resolved after EGD  #Hiatal hernia  - Food bolus advanced into stomach. Patient no longer obstructed  - Advance diet as tolerated. Would recommend soft diet for now  - Pantoprazole 40 mg po bid x2 weeks, followed by once daily x6 weeks  - Carafate Suspension 1g po qid x2 weeks    No obvious contraindication for discharge today from GI perspective. Please be sure patient has close follow up with his outpatient gastroenterology team. Will sign off for now. Please reconsult if needed.     Henry Sutton DO  Gastroenterology Fellow  GI Consult Pager Weekdays 7am-5pm: [phone]  Weeknights/Weekend/Holiday Coverage: Please Call the  for Contact Information  Follow Up Questions Welcome via [place] [employer] Messenger This is a 79 year old male with atrial fibrillation (anticoagulated with apixaban) and coronary artery disease who gastroenterology has been consulted for dysphagia. Patient describes eating dry steak at home and felt as if food became stuck.    6/22/25 CT chest with IV contrast identified distal esophageal hernia, possibly paraesophageal. Just proximal to the hernia the distal esophagus is distended where there may be impacted ingested material.     6/22/25 EGD  Impressions:  	Normal stomach.  	Normal duodenum.  	Grade D esophagitis in the GE junction and 3-4 cm above the GEJ compatible with non-erosive esophagitis.  	Esophageal hiatal hernia.    #Dysphagia secondary to food impaction, resolved after EGD  #Hiatal hernia  - Food bolus advanced into stomach. Patient no longer obstructed  - Advance diet as tolerated. Would recommend soft diet for now  - Pantoprazole 40 mg po bid x2 weeks, followed by once daily x6 weeks  - Carafate Suspension 1g po qid x2 weeks    No obvious contraindication for discharge today from GI perspective. Please be sure patient has close follow up with his outpatient gastroenterology team. Will sign off for now. Please reconsult if needed.     Henry Sutton DO  Gastroenterology Fellow  GI Consult Pager Weekdays 7am-5pm: [phone]  Weeknights/Weekend/Holiday Coverage: Please Call the  for Contact Information  Follow Up Questions Welcome via [place] [employer] Teams Messenger.

## 2025-06-23 NOTE — DISCHARGE NOTE PROVIDER - ATTENDING DISCHARGE PHYSICAL EXAMINATION:
Appears comfortable   MMM  Normal WOB on RA, CTAB   RRR, no mrg   Abdomen soft, nontender, nondistended  Extremities warm and without edema   AOX3, no focal neuro deficits

## 2025-06-23 NOTE — DISCHARGE NOTE PROVIDER - NSDCMRMEDTOKEN_GEN_ALL_CORE_FT
Atrovent HFA 17 mcg/inh inhalation aerosol: 2 puff(s) inhaled once a day as needed for  bronchospasm  betaxolol 0.25% ophthalmic suspension: 1 drop(s) in each affected eye once a day  Carafate 1 g oral tablet: 1 tab(s) orally every 6 hours  DilTIAZem (Eqv-Cardizem CD) 120 mg/24 hours oral capsule, extended release: 1 cap(s) orally once a day  Eliquis 5 mg oral tablet: 1 tab(s) orally every 12 hours  Lipitor 20 mg oral tablet: 1 tab(s) orally once a day (at bedtime)  lisinopril 40 mg oral tablet: 1 tab(s) orally once a day  Protonix 40 mg oral delayed release tablet: 1 tab(s) orally every 12 hours Please take two tabs ebery 12 hours for 2 weeks and then take one tab a day for 30 days.  Rocklatan 0.02%-0.005% ophthalmic solution: 1 drop(s) in each affected eye once a day

## 2025-06-23 NOTE — DISCHARGE NOTE NURSING/CASE MANAGEMENT/SOCIAL WORK - PATIENT PORTAL LINK FT
You can access the FollowMyHealth Patient Portal offered by [facility] by registering at the following website: [email or web address]. By joining [facility]’s FollowMyHealth portal, you will also be able to view your health information using other applications (apps) compatible with our system.

## 2025-06-23 NOTE — DISCHARGE NOTE PROVIDER - NSDCCPCAREPLAN_GEN_ALL_CORE_FT
PRINCIPAL DISCHARGE DIAGNOSIS  Diagnosis: Food impaction of esophagus  Assessment and Plan of Treatment: You were admitted for food getting stuck in your esophagus.  This has happened a few times over the past few years.  Fortunately, your EGD showed no signs of cancer.  You do have some inflammation in your esophagus (esophagitis) and a hiatal hernia, which could be contributing to this problem. You're now doing well on a soft, bite-sized diet, which you can continue at home.  Please make an appointment with Dr. Davis ([phone]), a gastroenterologist, within two days of leaving the hospital. You will take pantoprazole (40mg) twice a day for two weeks, then once a day for six weeks.  Also take carafate suspension (1g) four times a day for two weeks.        SECONDARY DISCHARGE DIAGNOSES  Diagnosis: Hiatal hernia  Assessment and Plan of Treatment: A hiatal hernia occurs when part of your stomach pushes up through your diaphragm.  This can cause acid reflux and heartburn, and sometimes difficulty swallowing.  Eating smaller meals, avoiding trigger foods, and not lying down after eating can help manage symptoms.  Losing weight if you're overweight and elevating the head of your bed can also provide relief.  In some cases, medication or surgery may be necessary.  Please follow up with Dr. Davis for fruther questions.       Diagnosis: Esophagitis  Assessment and Plan of Treatment: Grade D esophagitis means you have a significant level of inflammation in your esophagus, the tube connecting your mouth to your stomach.  This inflammation is often caused by stomach acid backing up into the esophagus.  It can lead to pain or difficulty swallowing.  Treatment typically involves medications to reduce acid production and protect the esophagus.  Lifestyle changes like avoiding trigger foods can also help.  It's important to take your medication as prescribed to help with this esophagitis.

## 2025-06-23 NOTE — DISCHARGE NOTE NURSING/CASE MANAGEMENT/SOCIAL WORK - NSDCVIVACCINE_GEN_ALL_CORE_FT
Tdap; 22-Jan-2025 21:00; Campbell Ordonez (RN); [employer]; [id number] (Exp. Date: 31-Aug-2026); IntraMuscular; Deltoid Left.; 0.5 milliLiter(s); VIS (VIS Published: 09-May-2013, VIS Presented: 22-Jan-2025);    Warm/Dry

## 2025-06-23 NOTE — DISCHARGE NOTE NURSING/CASE MANAGEMENT/SOCIAL WORK - FINANCIAL ASSISTANCE
[facility] provides services at a reduced cost to those who are determined to be eligible through [facility]’s financial assistance program. Information regarding [facility]’s financial assistance program can be found by going to [email or web address] or by calling [phone].

## 2025-06-23 NOTE — DISCHARGE NOTE PROVIDER - CARE PROVIDER_API CALL
Kristopher Diaz  Ophthalmology  [address] [place] [address]  Phone: [phone]  Fax: [phone]  Established Patient  Follow Up Time:

## 2025-06-25 ENCOUNTER — APPOINTMENT (OUTPATIENT)
Dept: OPHTHALMOLOGY | Facility: CLINIC | Age: 80
End: 2025-06-25
Payer: MEDICARE

## 2025-06-25 ENCOUNTER — NON-APPOINTMENT (OUTPATIENT)
Age: 80
End: 2025-06-25

## 2025-06-25 PROCEDURE — 92012 INTRM OPH EXAM EST PATIENT: CPT

## 2025-06-25 PROCEDURE — 92133 CPTRZD OPH DX IMG PST SGM ON: CPT

## 2025-06-25 PROCEDURE — 92083 EXTENDED VISUAL FIELD XM: CPT

## 2025-07-03 DIAGNOSIS — G20.A1 PARKINSON'S DISEASE WITHOUT DYSKINESIA, WITHOUT MENTION OF FLUCTUATIONS: ICD-10-CM

## 2025-07-03 DIAGNOSIS — R13.10 DYSPHAGIA, UNSPECIFIED: ICD-10-CM

## 2025-07-03 DIAGNOSIS — I65.29 OCCLUSION AND STENOSIS OF UNSPECIFIED CAROTID ARTERY: ICD-10-CM

## 2025-07-03 DIAGNOSIS — T18.128A FOOD IN ESOPHAGUS CAUSING OTHER INJURY, INITIAL ENCOUNTER: ICD-10-CM

## 2025-07-03 DIAGNOSIS — J45.909 UNSPECIFIED ASTHMA, UNCOMPLICATED: ICD-10-CM
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
## 2025-07-03 DIAGNOSIS — I48.91 UNSPECIFIED ATRIAL FIBRILLATION: ICD-10-CM

## 2025-07-03 DIAGNOSIS — R73.03 PREDIABETES: ICD-10-CM

## 2025-07-03 DIAGNOSIS — K20.90 ESOPHAGITIS, UNSPECIFIED WITHOUT BLEEDING: ICD-10-CM

## 2025-07-03 DIAGNOSIS — Z79.01 LONG TERM (CURRENT) USE OF ANTICOAGULANTS: ICD-10-CM

## 2025-07-03 DIAGNOSIS — K44.9 DIAPHRAGMATIC HERNIA WITHOUT OBSTRUCTION OR GANGRENE: ICD-10-CM

## 2025-07-03 DIAGNOSIS — H40.9 UNSPECIFIED GLAUCOMA: ICD-10-CM

## 2025-07-07 ENCOUNTER — APPOINTMENT (OUTPATIENT)
Dept: OPHTHALMOLOGY | Facility: CLINIC | Age: 80
End: 2025-07-07
Payer: MEDICARE

## 2025-07-07 ENCOUNTER — NON-APPOINTMENT (OUTPATIENT)
Age: 80
End: 2025-07-07

## 2025-07-07 PROCEDURE — 92201 OPSCPY EXTND RTA DRAW UNI/BI: CPT

## 2025-07-07 PROCEDURE — 92014 COMPRE OPH EXAM EST PT 1/>: CPT

## 2025-07-07 PROCEDURE — 92134 CPTRZ OPH DX IMG PST SGM RTA: CPT

## 2025-07-16 NOTE — H&P ADULT - NSHPPHYSICALEXAM_GEN_ALL_CORE
.  VITAL SIGNS:  T(C): 36.3 (06-22-25 @ 16:20), Max: 36.8 (06-21-25 @ 21:34)  T(F): 97.4 (06-22-25 @ 16:20), Max: 98.3 (06-21-25 @ 21:34)  HR: 99 (06-22-25 @ 16:20) (85 - 99)  BP: 163/93 (06-22-25 @ 16:20) (141/90 - 163/93)  BP(mean): --  RR: 18 (06-22-25 @ 16:20) (15 - 18)  SpO2: 97% (06-22-25 @ 16:20) (97% - 100%)  Wt(kg): --    PHYSICAL EXAM:    Constitutional: Rsitting up in bed; NAD; intermittently spitting into cup   Head: NC/AT  Eyes: PERRL, EOMI, clear conjunctiva  ENT: no nasal discharge; uvula midline, no oropharyngeal erythema or exudates; MMM  Neck: supple; no JVD or thyromegaly  Respiratory: CTA B/L; no W/R/R, no retractions  Cardiac: +S1/S2; RRR; no M/R/G;  Gastrointestinal: soft, NT/ND; no rebound or guarding; +BSx4  Extremities: WWP, no clubbing or cyanosis; no peripheral edema  Vascular: 2+ radial, femoral, DP/PT pulses B/L  Dermatologic: skin warm, dry and intact; no rashes, wounds, or scars  Neurologic: AAOx3  Psychiatric: affect and characteristics of appearance, verbalizations, behaviors are appropriate Detail Level: Detailed Depth Of Biopsy: dermis Was A Bandage Applied: Yes Size Of Lesion In Cm: 0 Biopsy Type: H and E Biopsy Method: Dermablade Anesthesia Type: 1% lidocaine with epinephrine Anesthesia Volume In Cc: 0.3 Hemostasis: Aluminum Chloride and Electrocautery Wound Care: Vaseline Dressing: Band-Aid Destruction After The Procedure: No Type Of Destruction Used: Electrodesiccation Cryotherapy Text: The wound bed was treated with cryotherapy after the biopsy was performed. Electrodesiccation Text: The wound bed was treated with electrodesiccation after the biopsy was performed. Electrodesiccation And Curettage Text: The wound bed was treated with electrodesiccation and curettage after the biopsy was performed. Silver Nitrate Text: The wound bed was treated with silver nitrate after the biopsy was performed. Lab: [id number] Lab Facility: [id number] Medical Necessity Information: It is in your best interest to select a reason for this procedure from the list below. All of these items fulfill various CMS LCD requirements except the new and changing color options. Consent: The risks and benefits of therapy were discussed, including incomplete removal, recurrence, bleeding, infection, scarring, discomfort, and prolonged wound healing. Post-Care Instructions: I reviewed post-care instructions with the patient. Patient is to leave our bandage on overnight. Tomorrow they may clean the area gently with soap and water or Hibiclens, then apply Vaseline ointment once or twice daily, changing out the bandage daily until the skin has completely healed over. Patient may apply hydrogen peroxide soaks to remove any crusting. Advised patient that keeping the area clean, moist and covered will minimize the risk of infection, crusting, scabbing and scarring. Minimal redness around the area is normal. Instructed to contact the office immediately should they develop significant bleeding, redness, tenderness, purulent drainage, fevers, or chills. Notification Instructions: Patient will be notified of biopsy results. However, patient instructed to call the office if not contacted within 2 weeks. Billing Type: Third-Party Bill Information: Selecting Yes will display possible errors in your note based on the variables you have selected. This validation is only offered as a suggestion for you. PLEASE NOTE THAT THE VALIDATION TEXT WILL BE REMOVED WHEN YOU FINALIZE YOUR NOTE. IF YOU WANT TO FAX A PRELIMINARY NOTE YOU WILL NEED TO TOGGLE THIS TO 'NO' IF YOU DO NOT WANT IT IN YOUR FAXED NOTE.

## (undated) DEVICE — POSITIONER FOAM EGG CRATE ULNAR 2PCS (PINK)

## (undated) DEVICE — SYR LUER LOK 20CC

## (undated) DEVICE — TUBING HYBRID CO2

## (undated) DEVICE — Device

## (undated) DEVICE — VENODYNE/SCD SLEEVE CALF MEDIUM

## (undated) DEVICE — TUBING ERBE CO2 OLYMPUS CONNECTOR

## (undated) DEVICE — WARMING BLANKET LOWER ADULT

## (undated) DEVICE — KIT ENDO PROCEDURE CUST W/VLV